# Patient Record
Sex: MALE | Race: WHITE | HISPANIC OR LATINO | Employment: OTHER
[De-identification: names, ages, dates, MRNs, and addresses within clinical notes are randomized per-mention and may not be internally consistent; named-entity substitution may affect disease eponyms.]

---

## 2018-10-17 ENCOUNTER — RECORDS - HEALTHEAST (OUTPATIENT)
Dept: ADMINISTRATIVE | Facility: OTHER | Age: 48
End: 2018-10-17

## 2018-10-17 ENCOUNTER — RECORDS - HEALTHEAST (OUTPATIENT)
Dept: LAB | Facility: CLINIC | Age: 48
End: 2018-10-17

## 2018-10-19 LAB
GAMMA INTERFERON BACKGROUND BLD IA-ACNC: 0.4 IU/ML
M TB IFN-G BLD-IMP: NEGATIVE
MITOGEN IGNF BCKGRD COR BLD-ACNC: 0.08 IU/ML
MITOGEN IGNF BCKGRD COR BLD-ACNC: 0.09 IU/ML
QTF INTERPRETATION: NORMAL
QTF MITOGEN - NIL: 7.71 IU/ML

## 2020-02-24 ENCOUNTER — HEALTH MAINTENANCE LETTER (OUTPATIENT)
Age: 50
End: 2020-02-24

## 2020-12-13 ENCOUNTER — HEALTH MAINTENANCE LETTER (OUTPATIENT)
Age: 50
End: 2020-12-13

## 2021-04-17 ENCOUNTER — HEALTH MAINTENANCE LETTER (OUTPATIENT)
Age: 51
End: 2021-04-17

## 2021-05-21 ENCOUNTER — NURSE TRIAGE (OUTPATIENT)
Dept: NURSING | Facility: CLINIC | Age: 51
End: 2021-05-21

## 2021-06-04 ENCOUNTER — ALLIED HEALTH/NURSE VISIT (OUTPATIENT)
Dept: NURSING | Facility: CLINIC | Age: 51
End: 2021-06-04
Payer: COMMERCIAL

## 2021-06-04 DIAGNOSIS — Z11.1 SCREENING EXAMINATION FOR PULMONARY TUBERCULOSIS: Primary | ICD-10-CM

## 2021-06-04 PROCEDURE — 86580 TB INTRADERMAL TEST: CPT

## 2021-06-04 PROCEDURE — 99207 PR NO CHARGE NURSE ONLY: CPT

## 2021-06-04 NOTE — NURSING NOTE
The patient is asked the following questions today and these are his answers:    -Have you had a mantoux administered in the past 30 days?    No  -Have you had a previous positive Mantoux.  No  -Have you received BCG in the past.  No  -Have you had a live vaccine  (MMR, Varicella, OPV, Yellow Fever) in the last 6 weeks.  No  -Have you had and active  viral or bacterial infection in the past 6 weeks.  No  -Have you received corticosteroids or immunosuppressive agents in the past 6 weeks.  No  -Have you been diagnosed with HIV?  No  -Do you have a malignancy?  No    Mantoux Questionnaire: answers were all negative.          PLACED ON RIGHT FOREARM 9:05AM BY Regine Herrera CMA     LOT: [id number], EXP: 01/06/2023, NDC: 47115-202-49  Patient will have this read outside of this clinic.     Regine Herrera CMA

## 2021-06-25 DIAGNOSIS — Z31.41 ENCOUNTER FOR SPERM COUNT FOR FERTILITY TESTING: ICD-10-CM

## 2021-06-25 PROCEDURE — 89320 SEMEN ANAL VOL/COUNT/MOT: CPT

## 2021-06-28 LAB
ABSTINENCE DAYS: 5 DAYS (ref 2–7)
AGGLUTINATION: NO YES/NO
ANALYSIS TEMP - CENTIGRADE: 23 CENTIGRADE
CELL FRAGMENTS: ABNORMAL %
COLLECTION METHOD: ABNORMAL
COLLECTION SITE: ABNORMAL
CONSENT TO RELEASE TO PARTNER: YES
IMMATURE SPERM: ABNORMAL %
IMMOTILE: 100 %
LAB RECEIPT TIME: ABNORMAL
LIQUEFIED: YES YES/NO
NON-PROGRESSIVE MOTILITY: 0 %
PROGRESSIVE MOTILITY: 0 % (ref 32–?)
ROUND CELLS: <0.1 MILLION/ML (ref ?–2)
SPECIMEN CONCENTRATION: 8 MILLION/ML (ref 15–?)
SPECIMEN PH: 7.6 PH (ref 7.2–?)
SPECIMEN TYPE: ABNORMAL
SPECIMEN VOL UR: 4.8 ML (ref 1.5–?)
TIME OF ANALYSIS: ABNORMAL
TOTAL NUMBER: 38 MILLION (ref 39–?)
TOTAL PROGRESSIVE MOTILE: 0 MILLION (ref 15.6–?)
VISCOUS: NO YES/NO
VITALITY: 4 % (ref 58–?)
WBC SPECIMEN: ABNORMAL %

## 2021-07-02 DIAGNOSIS — Z31.41 ENCOUNTER FOR SPERM COUNT FOR FERTILITY TESTING: Primary | ICD-10-CM

## 2021-08-20 ENCOUNTER — LAB (OUTPATIENT)
Dept: LAB | Facility: CLINIC | Age: 51
End: 2021-08-20
Attending: UROLOGY
Payer: COMMERCIAL

## 2021-08-20 DIAGNOSIS — Z31.41 ENCOUNTER FOR SPERM COUNT FOR FERTILITY TESTING: ICD-10-CM

## 2021-08-20 PROCEDURE — 89320 SEMEN ANAL VOL/COUNT/MOT: CPT

## 2021-08-23 LAB
ABSTINENCE DAYS: 2 DAYS (ref 2–7)
AGGLUTINATION: NO
ANALYSIS TEMP - CENTIGRADE: 22 CENTIGRADE
COLLECTION METHOD: NORMAL
COLLECTION SITE: NORMAL
CONSENT TO RELEASE TO PARTNER: YES
DAL- RECEIVED TIME: NORMAL
IMMOTILE: 62 %
LIQUEFIED: YES
NON-PROGRESSIVE MOTILITY: 2 %
PROGRESSIVE MOTILITY: 36 %
ROUND CELLS: 0 MILLION/ML
SPECIMEN PH: 7.6 PH
SPECIMEN VOLUME: 5.3 ML
SPERM CONCENTRATION: 20 MILLION/ML
TIME OF ANALYSIS: NORMAL
TOTAL PROGRESSIVE MOTILE NUMBER: 38 MILLION
TOTAL SPERM NUMBER: 106 MILLION
VISCOUS: NO
VITALITY: NORMAL

## 2021-09-10 ENCOUNTER — OFFICE VISIT (OUTPATIENT)
Dept: URGENT CARE | Facility: URGENT CARE | Age: 51
End: 2021-09-10
Payer: COMMERCIAL

## 2021-09-10 VITALS
TEMPERATURE: 98 F | BODY MASS INDEX: 36.21 KG/M2 | RESPIRATION RATE: 16 BRPM | HEART RATE: 86 BPM | SYSTOLIC BLOOD PRESSURE: 137 MMHG | WEIGHT: 245.2 LBS | OXYGEN SATURATION: 99 % | DIASTOLIC BLOOD PRESSURE: 83 MMHG

## 2021-09-10 DIAGNOSIS — R21 RASH AND NONSPECIFIC SKIN ERUPTION: ICD-10-CM

## 2021-09-10 DIAGNOSIS — B37.2 CANDIDIASIS OF SKIN: Primary | ICD-10-CM

## 2021-09-10 LAB
ALBUMIN SERPL-MCNC: 4.2 G/DL (ref 3.4–5)
ALP SERPL-CCNC: 65 U/L (ref 40–150)
ALT SERPL W P-5'-P-CCNC: 51 U/L (ref 0–70)
ANION GAP SERPL CALCULATED.3IONS-SCNC: 4 MMOL/L (ref 3–14)
AST SERPL W P-5'-P-CCNC: 27 U/L (ref 0–45)
BASOPHILS # BLD AUTO: 0.1 10E3/UL (ref 0–0.2)
BASOPHILS NFR BLD AUTO: 1 %
BILIRUB SERPL-MCNC: 0.5 MG/DL (ref 0.2–1.3)
BUN SERPL-MCNC: 19 MG/DL (ref 7–30)
CALCIUM SERPL-MCNC: 9.4 MG/DL (ref 8.5–10.1)
CHLORIDE BLD-SCNC: 106 MMOL/L (ref 94–109)
CO2 SERPL-SCNC: 28 MMOL/L (ref 20–32)
CREAT SERPL-MCNC: 1.26 MG/DL (ref 0.66–1.25)
EOSINOPHIL # BLD AUTO: 0.2 10E3/UL (ref 0–0.7)
EOSINOPHIL NFR BLD AUTO: 4 %
ERYTHROCYTE [DISTWIDTH] IN BLOOD BY AUTOMATED COUNT: 12.7 % (ref 10–15)
GFR SERPL CREATININE-BSD FRML MDRD: 67 ML/MIN/1.73M2
GLUCOSE BLD-MCNC: 88 MG/DL (ref 70–99)
HCT VFR BLD AUTO: 42.2 % (ref 40–53)
HGB BLD-MCNC: 14.2 G/DL (ref 13.3–17.7)
IMM GRANULOCYTES # BLD: 0 10E3/UL
IMM GRANULOCYTES NFR BLD: 0 %
LYMPHOCYTES # BLD AUTO: 2.2 10E3/UL (ref 0.8–5.3)
LYMPHOCYTES NFR BLD AUTO: 33 %
MCH RBC QN AUTO: 30 PG (ref 26.5–33)
MCHC RBC AUTO-ENTMCNC: 33.6 G/DL (ref 31.5–36.5)
MCV RBC AUTO: 89 FL (ref 78–100)
MONOCYTES # BLD AUTO: 0.7 10E3/UL (ref 0–1.3)
MONOCYTES NFR BLD AUTO: 11 %
NEUTROPHILS # BLD AUTO: 3.3 10E3/UL (ref 1.6–8.3)
NEUTROPHILS NFR BLD AUTO: 51 %
PLATELET # BLD AUTO: 266 10E3/UL (ref 150–450)
POTASSIUM BLD-SCNC: 3.8 MMOL/L (ref 3.4–5.3)
PROT SERPL-MCNC: 7.3 G/DL (ref 6.8–8.8)
RBC # BLD AUTO: 4.73 10E6/UL (ref 4.4–5.9)
SODIUM SERPL-SCNC: 138 MMOL/L (ref 133–144)
WBC # BLD AUTO: 6.6 10E3/UL (ref 4–11)

## 2021-09-10 PROCEDURE — 85025 COMPLETE CBC W/AUTO DIFF WBC: CPT | Performed by: PHYSICIAN ASSISTANT

## 2021-09-10 PROCEDURE — 99203 OFFICE O/P NEW LOW 30 MIN: CPT | Performed by: PHYSICIAN ASSISTANT

## 2021-09-10 PROCEDURE — 80053 COMPREHEN METABOLIC PANEL: CPT | Performed by: PHYSICIAN ASSISTANT

## 2021-09-10 PROCEDURE — 36415 COLL VENOUS BLD VENIPUNCTURE: CPT | Performed by: PHYSICIAN ASSISTANT

## 2021-09-10 RX ORDER — NYSTATIN 100000 U/G
CREAM TOPICAL 2 TIMES DAILY
Qty: 30 G | Refills: 1 | Status: SHIPPED | OUTPATIENT
Start: 2021-09-10 | End: 2021-09-24

## 2021-09-10 RX ORDER — CELECOXIB 200 MG/1
CAPSULE ORAL
COMMUNITY
Start: 2021-07-07 | End: 2022-08-29

## 2021-09-10 NOTE — PROGRESS NOTES
Chief Complaint   Patient presents with     Rash         Medical Decision Making:    Differential Diagnosis:  Rash: Atopic dermatitis  Candidiasis  Cellulitis  Contact dermatitis  Tinea corporis  Zoster  Viral exanthem       ASSESSMENT:    ICD-10-CM    1. Candidiasis of skin  B37.2 nystatin (MYCOSTATIN) 001525 UNIT/GM external cream     Comprehensive metabolic panel (BMP + Alb, Alk Phos, ALT, AST, Total. Bili, TP)     CBC with platelets and differential     CBC with platelets and differential     Comprehensive metabolic panel (BMP + Alb, Alk Phos, ALT, AST, Total. Bili, TP)   2. Rash and nonspecific skin eruption  R21 nystatin (MYCOSTATIN) 261615 UNIT/GM external cream     Comprehensive metabolic panel (BMP + Alb, Alk Phos, ALT, AST, Total. Bili, TP)     CBC with platelets and differential     CBC with platelets and differential     Comprehensive metabolic panel (BMP + Alb, Alk Phos, ALT, AST, Total. Bili, TP)         PLAN: Rash likely candidiasis. Will get routine labs to rule out diabetes, kidney or liver issues. Nystatin twice daily over the next 2 weeks.  See today's orders.  Follow-up with primary clinic if not improving.  Advised about symptoms which might herald more serious problems.        Sheba Wu PA-C         SUBJECTIVE:  52 yo male is here with his wife for pruritic rash for about 10 days. Started underneath his arms and also notes it in the groin, lower abdomen and waistline. No prior rash like this. No fever. No cough or sore throat. Tried oatmeal scrub and oatmeal lotion without resolution. Did have recent reverse vasectomy and due to inflammation after he was placed on Celebrex for 1 month then steroids, 2 different courses and then most recently Celebrex again for 2 weeks. No new topicals or laundry detergents. They do have 2 dogs but they have not been scratching or itching more than usual. Wife does not have any similar rash.             Allergies   Allergen Reactions     Percocet  [Oxycodone-Acetaminophen] Itching       Past Medical History:   Diagnosis Date     Elevated cholesterol      Hypertriglyceridemia        NO ACTIVE MEDICATIONS,   order for DME, Equipment being ordered: trilok ankle brace    No current facility-administered medications on file prior to visit.      Social History     Tobacco Use     Smoking status: Former Smoker     Quit date: 1995     Years since quittin.2     Smokeless tobacco: Never Used   Substance Use Topics     Alcohol use: Yes     Drug use: No       ROS:  General: negative for fever  SKIN: + as above      Physcial Exam:  /83 (BP Location: Left arm, Patient Position: Sitting, Cuff Size: Adult Large)   Pulse 86   Temp 98  F (36.7  C) (Tympanic)   Resp 16   Wt 111.2 kg (245 lb 3.2 oz)   SpO2 99%   BMI 36.21 kg/m        GENERAL: alert, no acute distress  EYES: conjunctival clear  RESP: Regular breathing rate  NEURO: awake .  SKIN: Bilateral axillary areas, upper lateral rib cage areas, bilateral groin and waistline with red confluent macules of various sizes with satellite lesions.   RAFFAELE JacksonC

## 2021-09-15 DIAGNOSIS — Z31.84 ENCOUNTER FOR FERTILITY PRESERVATION PROCEDURE: ICD-10-CM

## 2021-09-15 DIAGNOSIS — Z31.62 ENCOUNTER FOR FERTILITY PRESERVATION COUNSELING: Primary | ICD-10-CM

## 2021-09-17 ENCOUNTER — LAB (OUTPATIENT)
Dept: LAB | Facility: CLINIC | Age: 51
End: 2021-09-17
Payer: COMMERCIAL

## 2021-09-17 ENCOUNTER — VIRTUAL VISIT (OUTPATIENT)
Dept: FAMILY MEDICINE | Facility: CLINIC | Age: 51
End: 2021-09-17
Payer: COMMERCIAL

## 2021-09-17 DIAGNOSIS — Z11.4 SCREENING FOR HUMAN IMMUNODEFICIENCY VIRUS: ICD-10-CM

## 2021-09-17 DIAGNOSIS — Z11.4 SCREENING FOR HUMAN IMMUNODEFICIENCY VIRUS: Primary | ICD-10-CM

## 2021-09-17 DIAGNOSIS — Z11.59 NEED FOR HEPATITIS C SCREENING TEST: ICD-10-CM

## 2021-09-17 DIAGNOSIS — Z11.59 NEED FOR HEPATITIS B SCREENING TEST: ICD-10-CM

## 2021-09-17 PROCEDURE — 36415 COLL VENOUS BLD VENIPUNCTURE: CPT

## 2021-09-17 PROCEDURE — 99213 OFFICE O/P EST LOW 20 MIN: CPT | Mod: 95 | Performed by: FAMILY MEDICINE

## 2021-09-17 PROCEDURE — 87389 HIV-1 AG W/HIV-1&-2 AB AG IA: CPT

## 2021-09-17 PROCEDURE — 86803 HEPATITIS C AB TEST: CPT

## 2021-09-17 PROCEDURE — 87340 HEPATITIS B SURFACE AG IA: CPT

## 2021-09-17 NOTE — PROGRESS NOTES
John is a 51 year old who is being evaluated via a billable video visit.      How would you like to obtain your AVS? MyChart  If the video visit is dropped, the invitation should be resent by: Text to cell phone: mobile  Will anyone else be joining your video visit? No      Video Start Time: 810AM      Subjective   John is a 51 year old who presents for the following health issues:    HPI     Patient requesting HIV, hep b and hep c screening tests for andrology clinic.    Review of Systems   Constitutional, HEENT, cardiovascular, pulmonary, GI, , musculoskeletal, neuro, skin, endocrine and psych systems are negative, except as otherwise noted.      Objective           Vitals:  No vitals were obtained today due to virtual visit.    Physical Exam   GENERAL: Healthy, alert and no distress  EYES: Eyes grossly normal to inspection.  No discharge or erythema, or obvious scleral/conjunctival abnormalities.  RESP: No audible wheeze, cough, or visible cyanosis.  No visible retractions or increased work of breathing.    SKIN: Visible skin clear. No significant rash, abnormal pigmentation or lesions.  NEURO: Cranial nerves grossly intact.  Mentation and speech appropriate for age.  PSYCH: Mentation appears normal, affect normal/bright, judgement and insight intact, normal speech and appearance well-groomed.    A/P:  (Z11.4) Screening for human immunodeficiency virus  (primary encounter diagnosis)  Comment:   Plan: HIV Antigen Antibody Combo            (Z11.59) Need for hepatitis B screening test  Comment:   Plan: Hepatitis B surface antigen            (Z11.59) Need for hepatitis C screening test  Comment:   Plan: Hepatitis C antibody            Natalio Durham MD          Video-Visit Details    Type of service:  Video Visit    Video End Time:815AM    Originating Location (pt. Location): Home    Distant Location (provider location):  [facility]     Platform used for Video Visit: [employer]

## 2021-09-20 DIAGNOSIS — Z31.84 ENCOUNTER FOR FERTILITY PRESERVATION PROCEDURE: Primary | ICD-10-CM

## 2021-09-20 DIAGNOSIS — Z31.62 ENCOUNTER FOR FERTILITY PRESERVATION COUNSELING: ICD-10-CM

## 2021-09-20 LAB
HCV AB SERPL QL IA: NONREACTIVE
HIV 1+2 AB+HIV1 P24 AG SERPL QL IA: NONREACTIVE

## 2021-09-21 LAB — HBV SURFACE AG SERPL QL IA: NONREACTIVE

## 2021-09-24 ENCOUNTER — APPOINTMENT (OUTPATIENT)
Dept: LAB | Facility: CLINIC | Age: 51
End: 2021-09-24
Attending: UROLOGY
Payer: COMMERCIAL

## 2021-09-24 PROCEDURE — 89259 CRYOPRESERVATION SPERM: CPT | Performed by: UROLOGY

## 2021-09-26 ENCOUNTER — HEALTH MAINTENANCE LETTER (OUTPATIENT)
Age: 51
End: 2021-09-26

## 2021-09-27 LAB
ABSTINENCE DAYS: 2 DAYS (ref 2–7)
AGGLUTINATION: NO
ANALYSIS TEMP - CENTIGRADE: 21 CENTIGRADE
COLLECTION METHOD: ABNORMAL
COLLECTION SITE: ABNORMAL
CONSENT TO RELEASE TO PARTNER: YES
DAL- RECEIVED TIME: ABNORMAL
IMMOTILE: 71 %
LIQUEFIED: YES
NON-PROGRESSIVE MOTILITY: 7 %
PROGRESSIVE MOTILITY: 22 %
ROUND CELLS: <0.1 MILLION/ML
SPECIMEN PH: 7.6 PH
SPECIMEN VOLUME: 4.1 ML
SPERM CONCENTRATION: 1 MILLION/ML
TIME OF ANALYSIS: ABNORMAL
TOTAL PROGRESSIVE MOTILE NUMBER: 0.9 MILLION
TOTAL SPERM NUMBER: 4 MILLION
VISCOUS: NO
VITALITY: 41 %

## 2021-11-05 ENCOUNTER — LAB (OUTPATIENT)
Dept: LAB | Facility: CLINIC | Age: 51
End: 2021-11-05
Attending: UROLOGY
Payer: COMMERCIAL

## 2021-11-05 DIAGNOSIS — Z31.41 ENCOUNTER FOR SPERM COUNT FOR FERTILITY TESTING: ICD-10-CM

## 2021-11-05 PROCEDURE — 89320 SEMEN ANAL VOL/COUNT/MOT: CPT

## 2021-11-08 LAB
ABSTINENCE DAYS: 2 DAYS (ref 2–7)
AGGLUTINATION: NO
ANALYSIS TEMP - CENTIGRADE: 21 CENTIGRADE
COLLECTION METHOD: ABNORMAL
COLLECTION SITE: ABNORMAL
CONSENT TO RELEASE TO PARTNER: YES
DAL- RECEIVED TIME: ABNORMAL
IMMOTILE: 57 %
LIQUEFIED: YES
NON-PROGRESSIVE MOTILITY: 3 %
PROGRESSIVE MOTILITY: 40 %
ROUND CELLS: <0.1 MILLION/ML
SPECIMEN PH: 7.6 PH
SPECIMEN VOLUME: 4.6 ML
SPERM CONCENTRATION: 9 MILLION/ML
TIME OF ANALYSIS: ABNORMAL
TOTAL PROGRESSIVE MOTILE NUMBER: 16 MILLION
TOTAL SPERM NUMBER: 41 MILLION
VISCOUS: NO
VITALITY: ABNORMAL

## 2021-11-12 DIAGNOSIS — Z31.41 ENCOUNTER FOR SPERM COUNT FOR FERTILITY TESTING: Primary | ICD-10-CM

## 2021-12-10 ENCOUNTER — LAB (OUTPATIENT)
Dept: LAB | Facility: CLINIC | Age: 51
End: 2021-12-10
Attending: UROLOGY
Payer: COMMERCIAL

## 2021-12-10 DIAGNOSIS — Z31.41 ENCOUNTER FOR SPERM COUNT FOR FERTILITY TESTING: ICD-10-CM

## 2021-12-10 PROCEDURE — 89320 SEMEN ANAL VOL/COUNT/MOT: CPT

## 2021-12-13 LAB
ABSTINENCE DAYS: 2 DAYS (ref 2–7)
AGGLUTINATION: NO
ANALYSIS TEMP - CENTIGRADE: 24 CENTIGRADE
COLLECTION METHOD: ABNORMAL
COLLECTION SITE: ABNORMAL
CONSENT TO RELEASE TO PARTNER: YES
DAL- RECEIVED TIME: ABNORMAL
IMMOTILE: 58 %
LIQUEFIED: YES
NON-PROGRESSIVE MOTILITY: 8 %
PROGRESSIVE MOTILITY: 34 %
ROUND CELLS: <0.1 MILLION/ML
SPECIMEN PH: 7.6 PH
SPECIMEN VOLUME: 4.2 ML
SPERM CONCENTRATION: 2 MILLION/ML
TIME OF ANALYSIS: ABNORMAL
TOTAL PROGRESSIVE MOTILE NUMBER: 3 MILLION
TOTAL SPERM NUMBER: 8 MILLION
VISCOUS: NO
VITALITY: ABNORMAL

## 2022-01-07 ENCOUNTER — APPOINTMENT (OUTPATIENT)
Dept: LAB | Facility: CLINIC | Age: 52
End: 2022-01-07
Attending: UROLOGY
Payer: COMMERCIAL

## 2022-01-07 PROCEDURE — 89320 SEMEN ANAL VOL/COUNT/MOT: CPT | Performed by: UROLOGY

## 2022-01-15 ENCOUNTER — LAB (OUTPATIENT)
Dept: LAB | Facility: CLINIC | Age: 52
End: 2022-01-15
Payer: COMMERCIAL

## 2022-01-15 DIAGNOSIS — E29.1 MALE HYPOGONADISM: ICD-10-CM

## 2022-01-15 DIAGNOSIS — E29.1 HYPOGONADISM MALE: ICD-10-CM

## 2022-01-15 LAB
ESTRADIOL SERPL-MCNC: 87 PG/ML
FSH SERPL-ACNC: 11.6 IU/L (ref 0.7–10.8)
LH SERPL-ACNC: 3.2 IU/L (ref 1.5–9.3)

## 2022-01-15 PROCEDURE — 84403 ASSAY OF TOTAL TESTOSTERONE: CPT

## 2022-01-15 PROCEDURE — 83002 ASSAY OF GONADOTROPIN (LH): CPT

## 2022-01-15 PROCEDURE — 82670 ASSAY OF TOTAL ESTRADIOL: CPT

## 2022-01-15 PROCEDURE — 83001 ASSAY OF GONADOTROPIN (FSH): CPT

## 2022-01-15 PROCEDURE — 36415 COLL VENOUS BLD VENIPUNCTURE: CPT

## 2022-01-18 LAB — TESTOST SERPL-MCNC: 249 NG/DL (ref 240–950)

## 2022-02-11 ENCOUNTER — LAB (OUTPATIENT)
Dept: LAB | Facility: CLINIC | Age: 52
End: 2022-02-11
Attending: UROLOGY
Payer: COMMERCIAL

## 2022-02-11 DIAGNOSIS — Z31.41 ENCOUNTER FOR SPERM COUNT FOR FERTILITY TESTING: ICD-10-CM

## 2022-02-11 LAB
ABSTINENCE DAYS: 3 DAYS (ref 2–7)
AGGLUTINATION: NO
ANALYSIS TEMP - CENTIGRADE: 22 CENTIGRADE
COLLECTION METHOD: ABNORMAL
COLLECTION SITE: ABNORMAL
CONSENT TO RELEASE TO PARTNER: YES
DAL- RECEIVED TIME: ABNORMAL
IMMOTILE: 82 %
LIQUEFIED: YES
NON-PROGRESSIVE MOTILITY: 3 %
PROGRESSIVE MOTILITY: 15 %
ROUND CELLS: 0.2 MILLION/ML
SPECIMEN PH: 7.2 PH
SPECIMEN VOLUME: 3.9 ML
SPERM CONCENTRATION: 0.8 MILLION/ML
TIME OF ANALYSIS: ABNORMAL
TOTAL PROGRESSIVE MOTILE NUMBER: 0.45 MILLION
TOTAL SPERM NUMBER: 3 MILLION
VISCOUS: NO
VITALITY: ABNORMAL

## 2022-02-11 PROCEDURE — 89320 SEMEN ANAL VOL/COUNT/MOT: CPT

## 2022-03-11 ENCOUNTER — LAB (OUTPATIENT)
Dept: LAB | Facility: CLINIC | Age: 52
End: 2022-03-11
Attending: UROLOGY
Payer: COMMERCIAL

## 2022-03-11 DIAGNOSIS — Z31.41 ENCOUNTER FOR SPERM COUNT FOR FERTILITY TESTING: ICD-10-CM

## 2022-03-11 LAB
ABSTINENCE DAYS: 2 DAYS (ref 2–7)
AGGLUTINATION: NO
ANALYSIS TEMP - CENTIGRADE: 23 CENTIGRADE
COLLECTION METHOD: ABNORMAL
COLLECTION SITE: ABNORMAL
CONSENT TO RELEASE TO PARTNER: YES
DAL- RECEIVED TIME: ABNORMAL
IMMOTILE: 86 %
LIQUEFIED: YES
NON-PROGRESSIVE MOTILITY: 5 %
PROGRESSIVE MOTILITY: 9 %
ROUND CELLS: <0.1 MILLION/ML
SPECIMEN PH: 7.2 PH
SPECIMEN VOLUME: 4 ML
SPERM CONCENTRATION: 0.3 MILLION/ML
TIME OF ANALYSIS: ABNORMAL
TOTAL PROGRESSIVE MOTILE NUMBER: 0.09 MILLION
TOTAL SPERM NUMBER: 1 MILLION
VISCOUS: NO
VITALITY: ABNORMAL

## 2022-03-11 PROCEDURE — 89320 SEMEN ANAL VOL/COUNT/MOT: CPT

## 2022-04-08 ENCOUNTER — LAB (OUTPATIENT)
Dept: LAB | Facility: CLINIC | Age: 52
End: 2022-04-08
Attending: UROLOGY
Payer: COMMERCIAL

## 2022-04-08 DIAGNOSIS — Z31.41 ENCOUNTER FOR SPERM COUNT FOR FERTILITY TESTING: ICD-10-CM

## 2022-04-08 PROCEDURE — 89320 SEMEN ANAL VOL/COUNT/MOT: CPT

## 2022-04-11 LAB
ABSTINENCE DAYS: 3 DAYS (ref 2–7)
AGGLUTINATION: NO
ANALYSIS TEMP - CENTIGRADE: 23 CENTIGRADE
COLLECTION METHOD: ABNORMAL
COLLECTION SITE: ABNORMAL
CONSENT TO RELEASE TO PARTNER: YES
DAL- RECEIVED TIME: ABNORMAL
IMMOTILE: 100 %
LIQUEFIED: YES
NON-PROGRESSIVE MOTILITY: 0 %
PROGRESSIVE MOTILITY: 0 %
ROUND CELLS: <0.1 MILLION/ML
SPECIMEN PH: 7.2 PH
SPECIMEN VOLUME: 5.3 ML
SPERM CONCENTRATION: 0.1 MILLION/ML
TIME OF ANALYSIS: ABNORMAL
TOTAL PROGRESSIVE MOTILE NUMBER: 0 MILLION
TOTAL SPERM NUMBER: 0.5 MILLION
VISCOUS: NO
VITALITY: ABNORMAL

## 2022-04-15 DIAGNOSIS — E29.1 HYPOGONADISM MALE: Primary | ICD-10-CM

## 2022-04-16 ENCOUNTER — LAB (OUTPATIENT)
Dept: LAB | Facility: CLINIC | Age: 52
End: 2022-04-16
Payer: COMMERCIAL

## 2022-04-16 DIAGNOSIS — E29.1 HYPOGONADISM MALE: ICD-10-CM

## 2022-04-16 LAB
ESTRADIOL SERPL-MCNC: 70 PG/ML
FSH SERPL-ACNC: 24.2 IU/L (ref 0.7–10.8)
LH SERPL-ACNC: 7.4 IU/L (ref 1.5–9.3)

## 2022-04-16 PROCEDURE — 83002 ASSAY OF GONADOTROPIN (LH): CPT

## 2022-04-16 PROCEDURE — 83001 ASSAY OF GONADOTROPIN (FSH): CPT

## 2022-04-16 PROCEDURE — 82670 ASSAY OF TOTAL ESTRADIOL: CPT

## 2022-04-16 PROCEDURE — 84403 ASSAY OF TOTAL TESTOSTERONE: CPT

## 2022-04-16 PROCEDURE — 36415 COLL VENOUS BLD VENIPUNCTURE: CPT

## 2022-04-19 LAB — TESTOST SERPL-MCNC: 435 NG/DL (ref 240–950)

## 2022-05-08 ENCOUNTER — HEALTH MAINTENANCE LETTER (OUTPATIENT)
Age: 52
End: 2022-05-08

## 2022-08-29 ENCOUNTER — OFFICE VISIT (OUTPATIENT)
Dept: FAMILY MEDICINE | Facility: CLINIC | Age: 52
End: 2022-08-29
Payer: COMMERCIAL

## 2022-08-29 VITALS
RESPIRATION RATE: 9 BRPM | SYSTOLIC BLOOD PRESSURE: 117 MMHG | OXYGEN SATURATION: 97 % | HEIGHT: 69 IN | BODY MASS INDEX: 35.31 KG/M2 | TEMPERATURE: 97.9 F | HEART RATE: 88 BPM | WEIGHT: 238.4 LBS | DIASTOLIC BLOOD PRESSURE: 81 MMHG

## 2022-08-29 DIAGNOSIS — Z22.7 LATENT TUBERCULOSIS: ICD-10-CM

## 2022-08-29 DIAGNOSIS — Z12.11 SCREEN FOR COLON CANCER: ICD-10-CM

## 2022-08-29 DIAGNOSIS — Z11.1 SCREENING EXAMINATION FOR PULMONARY TUBERCULOSIS: Primary | ICD-10-CM

## 2022-08-29 LAB
ALBUMIN SERPL-MCNC: 4 G/DL (ref 3.4–5)
ALP SERPL-CCNC: 52 U/L (ref 40–150)
ALT SERPL W P-5'-P-CCNC: 40 U/L (ref 0–70)
AST SERPL W P-5'-P-CCNC: 30 U/L (ref 0–45)
BILIRUB DIRECT SERPL-MCNC: <0.1 MG/DL (ref 0–0.2)
BILIRUB SERPL-MCNC: 0.5 MG/DL (ref 0.2–1.3)
PROT SERPL-MCNC: 7.1 G/DL (ref 6.8–8.8)

## 2022-08-29 PROCEDURE — 99213 OFFICE O/P EST LOW 20 MIN: CPT | Performed by: PHYSICIAN ASSISTANT

## 2022-08-29 PROCEDURE — 36415 COLL VENOUS BLD VENIPUNCTURE: CPT | Performed by: PHYSICIAN ASSISTANT

## 2022-08-29 PROCEDURE — 80076 HEPATIC FUNCTION PANEL: CPT | Performed by: PHYSICIAN ASSISTANT

## 2022-08-29 PROCEDURE — 86481 TB AG RESPONSE T-CELL SUSP: CPT | Performed by: PHYSICIAN ASSISTANT

## 2022-08-29 NOTE — PROGRESS NOTES
"  Assessment & Plan   Problem List Items Addressed This Visit    None     Visit Diagnoses     Screening examination for pulmonary tuberculosis    -  Primary    Relevant Orders    Quantiferon TB Gold Plus    Hepatic panel (Albumin, ALT, AST, Bili, Alk Phos, TP)    Screen for colon cancer        Relevant Orders    COLOGUARD(EXACT SCIENCES) (Completed)         Discussed latent TB treatment  will confirm latent Tb with Tb gold test before initiating the treatment   Discussed Rifampin and liver enzymes screen today. Patient had a negative chest xray on 4/1/22  Letter was provided     12 minutes spent on the date of the encounter doing chart review, history and exam, documentation and further activities per the note       BMI:   Estimated body mass index is 34.79 kg/m  as calculated from the following:    Height as of this encounter: 1.763 m (5' 9.41\").    Weight as of this encounter: 108.1 kg (238 lb 6.4 oz).           Return in about 1 week (around 9/5/2022), or if symptoms worsen or fail to improve.    Ange Dubose PA-C  [facility] EMA Lopez is a 52 year old presenting for the following health issues:  Results (Review TB results) and Letter for School/Work    History of Present Illness       Reason for visit:  Review tb results and write a letter for employer    He eats 0-1 servings of fruits and vegetables daily.He consumes 0 sweetened beverage(s) daily.He exercises with enough effort to increase his heart rate 9 or less minutes per day.  He exercises with enough effort to increase his heart rate 3 or less days per week.   He is taking medications regularly.     Patient had positive T-spot on 3/25/22 with a follow up chest xray that was negative for active TB  Patient denies any chest pain, cough, hemoptysis, fevers/night sweats or weight loss.   Patient reports that in 2007 he might have had a possible exposure at work and was given a shot to prevent TB, presumably BCG " "vaccine     Review of Systems   Constitutional, HEENT, cardiovascular, pulmonary, gi and gu systems are negative, except as otherwise noted.      Objective    /81 (BP Location: Left arm, Patient Position: Sitting, Cuff Size: Adult Large)   Pulse 88   Temp 97.9  F (36.6  C) (Oral)   Resp 9   Ht 1.763 m (5' 9.41\")   Wt 108.1 kg (238 lb 6.4 oz)   SpO2 97%   BMI 34.79 kg/m    Body mass index is 34.79 kg/m .  Physical Exam     GENERAL: healthy, alert and no distress  EYES: Eyes grossly normal to inspection,  conjunctivae and sclerae normal  HENT: normal cephalic/atraumatic, face is symmetrical,   RESP: no difficulty breathing, no use of accessory muscles observed.   CV: no peripheral edema and color normal, no parasternal heaves visualized.   MS: no gross musculoskeletal defects noted, no edema  SKIN: no suspicious lesions or rashes  NEURO: Normal strength and tone, mentation intact and speech normal  PSYCH: mentation appears normal, affect normal/bright      Labs: T-spot and chest xray were reviewed and scanned into the chart               .  ..  "

## 2022-08-29 NOTE — PATIENT INSTRUCTIONS
At [facility], we strive to deliver an exceptional experience to you, every time we see you. If you receive a survey, please complete it as we do value your feedback.  If you have MyChart, you can expect to receive results automatically within 24 hours of their completion.  Your provider will send a note interpreting your results as well.   If you do not have MyChart, you should receive your results in about a week by mail.    Your care team:                            Family Medicine Internal Medicine   MD Rafa Mosquera MD Shantel Branch-Fleming, MD Srinivasa Vaka, MD Katya Belousova, LISA Lindsay CNP, MD ([facility]   Jagjit Hernández, MD Steph Humphreys MD Amelia Massimini APRN CNP Kim Thein, APRN CNP Bethany Templen, MD             Clinic hours: Monday - Thursday 7 am-6 pm; Fridays 7 am-5 pm.   Urgent care: Monday - Friday 10 am- 8 pm; Saturday and Sunday 9 am-5 pm.    Clinic: [phone]       [facility]: Monday - Thursday 8 am - 7 pm; Friday 8 am - 6 pm  [facility]: [phone]

## 2022-08-29 NOTE — LETTER
[facility]  [address] [address]  Phone: [phone]    August 29, 2022        John Joshi  [address] [facility] [address]          To whom it may concern:    RE: John Joshi    Patient was seen and treated today at our clinic. Patient has had a positive TB t-spot on March 25 2022 with a follow up chest xray that showed no active TB on 4/1/22. Patient is considered not contageous and is safe to return to work    Please contact me for questions or concerns.      Sincerely,        Ange Dubose PA-C

## 2022-08-31 ENCOUNTER — MYC MEDICAL ADVICE (OUTPATIENT)
Dept: FAMILY MEDICINE | Facility: CLINIC | Age: 52
End: 2022-08-31

## 2022-08-31 LAB
GAMMA INTERFERON BACKGROUND BLD IA-ACNC: 0.11 IU/ML
M TB IFN-G BLD-IMP: POSITIVE
M TB IFN-G CD4+ BCKGRND COR BLD-ACNC: 9.89 IU/ML
MITOGEN IGNF BCKGRD COR BLD-ACNC: 0.89 IU/ML
MITOGEN IGNF BCKGRD COR BLD-ACNC: 1.02 IU/ML
QUANTIFERON MITOGEN: 10 IU/ML
QUANTIFERON NIL TUBE: 0.11 IU/ML
QUANTIFERON TB1 TUBE: 1.13 IU/ML
QUANTIFERON TB2 TUBE: 1

## 2022-09-01 RX ORDER — RIFAMPIN 300 MG/1
600 CAPSULE ORAL DAILY
Qty: 240 CAPSULE | Refills: 0 | Status: SHIPPED | OUTPATIENT
Start: 2022-09-01 | End: 2022-12-30

## 2022-09-01 NOTE — TELEPHONE ENCOUNTER
Pt is responding to TB results from 829.      Routing to provider.    Estella Garner RN  [facility]

## 2022-09-27 ENCOUNTER — MYC MEDICAL ADVICE (OUTPATIENT)
Dept: FAMILY MEDICINE | Facility: CLINIC | Age: 52
End: 2022-09-27

## 2022-09-30 LAB — NONINV COLON CA DNA+OCC BLD SCRN STL QL: NEGATIVE

## 2022-10-31 ENCOUNTER — MYC MEDICAL ADVICE (OUTPATIENT)
Dept: FAMILY MEDICINE | Facility: CLINIC | Age: 52
End: 2022-10-31

## 2022-10-31 DIAGNOSIS — Z22.7 LATENT TUBERCULOSIS: Primary | ICD-10-CM

## 2022-11-16 ENCOUNTER — LAB (OUTPATIENT)
Dept: LAB | Facility: CLINIC | Age: 52
End: 2022-11-16
Payer: COMMERCIAL

## 2022-11-16 DIAGNOSIS — Z22.7 LATENT TUBERCULOSIS: ICD-10-CM

## 2022-11-16 DIAGNOSIS — Z13.220 SCREENING FOR HYPERLIPIDEMIA: Primary | ICD-10-CM

## 2022-11-16 LAB
ALBUMIN SERPL-MCNC: 4.2 G/DL (ref 3.4–5)
ALP SERPL-CCNC: 63 U/L (ref 40–150)
ALT SERPL W P-5'-P-CCNC: 26 U/L (ref 0–70)
AST SERPL W P-5'-P-CCNC: 15 U/L (ref 0–45)
BILIRUB DIRECT SERPL-MCNC: <0.1 MG/DL (ref 0–0.2)
BILIRUB SERPL-MCNC: 0.2 MG/DL (ref 0.2–1.3)
CHOLEST SERPL-MCNC: 190 MG/DL
FASTING STATUS PATIENT QL REPORTED: NO
HDLC SERPL-MCNC: 49 MG/DL
LDLC SERPL CALC-MCNC: 89 MG/DL
NONHDLC SERPL-MCNC: 141 MG/DL
PROT SERPL-MCNC: 7 G/DL (ref 6.8–8.8)
TRIGL SERPL-MCNC: 261 MG/DL

## 2022-11-16 PROCEDURE — 36415 COLL VENOUS BLD VENIPUNCTURE: CPT

## 2022-11-16 PROCEDURE — 80076 HEPATIC FUNCTION PANEL: CPT

## 2022-11-16 PROCEDURE — 80061 LIPID PANEL: CPT

## 2022-12-27 DIAGNOSIS — Z22.7 LATENT TUBERCULOSIS: ICD-10-CM

## 2022-12-28 RX ORDER — RIFAMPIN 300 MG/1
600 CAPSULE ORAL DAILY
Qty: 60 CAPSULE | Refills: 3 | OUTPATIENT
Start: 2022-12-28 | End: 2023-04-27

## 2023-01-02 ENCOUNTER — MYC MEDICAL ADVICE (OUTPATIENT)
Dept: FAMILY MEDICINE | Facility: CLINIC | Age: 53
End: 2023-01-02

## 2023-04-23 ENCOUNTER — HEALTH MAINTENANCE LETTER (OUTPATIENT)
Age: 53
End: 2023-04-23

## 2023-06-02 ENCOUNTER — HEALTH MAINTENANCE LETTER (OUTPATIENT)
Age: 53
End: 2023-06-02

## 2023-07-31 ENCOUNTER — OFFICE VISIT (OUTPATIENT)
Dept: FAMILY MEDICINE | Facility: CLINIC | Age: 53
End: 2023-07-31
Payer: COMMERCIAL

## 2023-07-31 VITALS
OXYGEN SATURATION: 98 % | HEART RATE: 88 BPM | SYSTOLIC BLOOD PRESSURE: 130 MMHG | DIASTOLIC BLOOD PRESSURE: 85 MMHG | RESPIRATION RATE: 20 BRPM | BODY MASS INDEX: 37.8 KG/M2 | WEIGHT: 255.2 LBS | TEMPERATURE: 97.4 F | HEIGHT: 69 IN

## 2023-07-31 DIAGNOSIS — M79.631 PAIN OF RIGHT FOREARM: ICD-10-CM

## 2023-07-31 DIAGNOSIS — R53.83 OTHER FATIGUE: ICD-10-CM

## 2023-07-31 DIAGNOSIS — E29.1 HYPOGONADISM MALE: Primary | ICD-10-CM

## 2023-07-31 DIAGNOSIS — E66.812 CLASS 2 OBESITY DUE TO EXCESS CALORIES WITHOUT SERIOUS COMORBIDITY WITH BODY MASS INDEX (BMI) OF 37.0 TO 37.9 IN ADULT: ICD-10-CM

## 2023-07-31 DIAGNOSIS — E66.09 CLASS 2 OBESITY DUE TO EXCESS CALORIES WITHOUT SERIOUS COMORBIDITY WITH BODY MASS INDEX (BMI) OF 37.0 TO 37.9 IN ADULT: ICD-10-CM

## 2023-07-31 LAB
ALBUMIN SERPL BCG-MCNC: 4.9 G/DL (ref 3.5–5.2)
ALP SERPL-CCNC: 66 U/L (ref 40–129)
ALT SERPL W P-5'-P-CCNC: 37 U/L (ref 0–70)
ANION GAP SERPL CALCULATED.3IONS-SCNC: 11 MMOL/L (ref 7–15)
AST SERPL W P-5'-P-CCNC: 46 U/L (ref 0–45)
BILIRUB SERPL-MCNC: 0.5 MG/DL
BUN SERPL-MCNC: 17 MG/DL (ref 6–20)
CALCIUM SERPL-MCNC: 9.5 MG/DL (ref 8.6–10)
CHLORIDE SERPL-SCNC: 105 MMOL/L (ref 98–107)
CHOLEST SERPL-MCNC: 184 MG/DL
CREAT SERPL-MCNC: 1.38 MG/DL (ref 0.67–1.17)
DEPRECATED CALCIDIOL+CALCIFEROL SERPL-MC: 31 UG/L (ref 20–75)
DEPRECATED HCO3 PLAS-SCNC: 24 MMOL/L (ref 22–29)
ERYTHROCYTE [DISTWIDTH] IN BLOOD BY AUTOMATED COUNT: 13.4 % (ref 10–15)
GFR SERPL CREATININE-BSD FRML MDRD: 62 ML/MIN/1.73M2
GLUCOSE SERPL-MCNC: 96 MG/DL (ref 70–99)
HBA1C MFR BLD: 6 % (ref 0–5.6)
HCT VFR BLD AUTO: 43.4 % (ref 40–53)
HDLC SERPL-MCNC: 33 MG/DL
HGB BLD-MCNC: 14.7 G/DL (ref 13.3–17.7)
LDLC SERPL CALC-MCNC: 100 MG/DL
MCH RBC QN AUTO: 29.6 PG (ref 26.5–33)
MCHC RBC AUTO-ENTMCNC: 33.9 G/DL (ref 31.5–36.5)
MCV RBC AUTO: 87 FL (ref 78–100)
NONHDLC SERPL-MCNC: 151 MG/DL
PLATELET # BLD AUTO: 272 10E3/UL (ref 150–450)
POTASSIUM SERPL-SCNC: 4.3 MMOL/L (ref 3.4–5.3)
PROT SERPL-MCNC: 7.3 G/DL (ref 6.4–8.3)
RBC # BLD AUTO: 4.97 10E6/UL (ref 4.4–5.9)
SODIUM SERPL-SCNC: 140 MMOL/L (ref 136–145)
TRIGL SERPL-MCNC: 257 MG/DL
TSH SERPL DL<=0.005 MIU/L-ACNC: 1.25 UIU/ML (ref 0.3–4.2)
WBC # BLD AUTO: 6.1 10E3/UL (ref 4–11)

## 2023-07-31 PROCEDURE — 80053 COMPREHEN METABOLIC PANEL: CPT | Performed by: PHYSICIAN ASSISTANT

## 2023-07-31 PROCEDURE — 99214 OFFICE O/P EST MOD 30 MIN: CPT | Performed by: PHYSICIAN ASSISTANT

## 2023-07-31 PROCEDURE — 83036 HEMOGLOBIN GLYCOSYLATED A1C: CPT | Performed by: PHYSICIAN ASSISTANT

## 2023-07-31 PROCEDURE — 85027 COMPLETE CBC AUTOMATED: CPT | Performed by: PHYSICIAN ASSISTANT

## 2023-07-31 PROCEDURE — 82306 VITAMIN D 25 HYDROXY: CPT | Performed by: PHYSICIAN ASSISTANT

## 2023-07-31 PROCEDURE — 84403 ASSAY OF TOTAL TESTOSTERONE: CPT | Performed by: PHYSICIAN ASSISTANT

## 2023-07-31 PROCEDURE — 84443 ASSAY THYROID STIM HORMONE: CPT | Performed by: PHYSICIAN ASSISTANT

## 2023-07-31 PROCEDURE — 36415 COLL VENOUS BLD VENIPUNCTURE: CPT | Performed by: PHYSICIAN ASSISTANT

## 2023-07-31 PROCEDURE — 80061 LIPID PANEL: CPT | Performed by: PHYSICIAN ASSISTANT

## 2023-07-31 ASSESSMENT — ENCOUNTER SYMPTOMS: FATIGUE: 1

## 2023-07-31 NOTE — PROGRESS NOTES
"  Assessment & Plan   Problem List Items Addressed This Visit    None  Visit Diagnoses       Hypogonadism male    -  Primary    Relevant Orders    Adult Endocrinology  Referral    Class 2 obesity due to excess calories without serious comorbidity with body mass index (BMI) of 37.0 to 37.9 in adult        Relevant Orders    Adult Comprehensive Weight Management  Referral    Lipid Profile (Chol, Trig, HDL, LDL calc)    Hemoglobin A1c    Other fatigue        Relevant Orders    TSH with free T4 reflex    Testosterone, total    CBC with platelets    Vitamin D Deficiency    Comprehensive metabolic panel (BMP + Alb, Alk Phos, ALT, AST, Total. Bili, TP)    Pain of right forearm        Relevant Orders    MR Forearm Right w/o Contrast           Establish with endocrinology for hypogonadism treatment     Schedule MRI to rule out bicep tendon rupture    25 minutes spent by me on the date of the encounter doing chart review, history and exam, documentation and further activities per the note       BMI:   Estimated body mass index is 37.24 kg/m  as calculated from the following:    Height as of this encounter: 1.763 m (5' 9.41\").    Weight as of this encounter: 115.8 kg (255 lb 3.2 oz).   Weight management plan: Patient referred to endocrine and/or weight management specialty        CYNTHIA [facility] DENNIS Lopez is a 53 year old, presenting for the following health issues:  Orders (Wants orders for hormonal check) and Fatigue        7/31/2023     7:20 AM   Additional Questions   Roomed by Jalen         7/31/2023     7:20 AM   Patient Reported Additional Medications   Patient reports taking the following new medications supplements       History of Present Illness       Reason for visit:  Always tired. Was on testosterone meds recently and need to be re- instated  Symptoms include:  Tired  Symptom intensity:  Moderate  Symptom progression:  Staying the " "same  Had these symptoms before:  Yes  Has tried/received treatment for these symptoms:  Yes  Previous treatment was successful:  Yes  Prior treatment description:  Testosterone meds  What makes it worse:  Not on meds  What makes it better:  Treatment    He eats 2-3 servings of fruits and vegetables daily.He consumes 0 sweetened beverage(s) daily.He exercises with enough effort to increase his heart rate 30 to 60 minutes per day.  He exercises with enough effort to increase his heart rate 4 days per week.   He is taking medications regularly.     hypogonadism    Duration: chronic-  Description (location/character/radiation): patient used to be treated by urology/endocrinology with Clomid and Anastrozole  Intensity:  moderate  Accompanying signs and symptoms: fatigue  History (similar episodes/previous evaluation):   Precipitating or alleviating factors:   Therapies tried and outcome: Clomid and Anastrozole, has been off medications for 1 year         Concern - right forearm injury  Onset: 8 years ago  Description: patient was holding a large piece granit and felt a pop and immediate pain in the right inner forearm in the antecubital region. Could not use arm for 1-2 months, then restarted, but feels weakness and pain with weight lifting since  Intensity: moderate  Progression of Symptoms:  same  Accompanying Signs & Symptoms: none  Previous history of similar problem: no  Precipitating factors:        Worsened by: lift weighting  Alleviating factors:        Improved by: rest  Therapies tried and outcome: tennis elbow strap        Review of Systems   Constitutional:  Positive for fatigue.      Constitutional, HEENT, cardiovascular, pulmonary, gi and gu systems are negative, except as otherwise noted.      Objective    /85 (BP Location: Left arm, Patient Position: Sitting, Cuff Size: Adult Large)   Pulse 88   Temp 97.4  F (36.3  C) (Oral)   Resp 20   Ht 1.763 m (5' 9.41\")   Wt 115.8 kg (255 lb 3.2 oz)   " SpO2 98%   BMI 37.24 kg/m    Body mass index is 37.24 kg/m .  Physical Exam   GENERAL: healthy, alert and no distress  NECK: no adenopathy, no asymmetry, masses, or scars and thyroid normal to palpation  RESP: lungs clear to auscultation - no rales, rhonchi or wheezes  CV: regular rate and rhythm, normal S1 S2, no S3 or S4, no murmur, click or rub, no peripheral edema and peripheral pulses strong  ABDOMEN: soft, nontender, no hepatosplenomegaly, no masses and bowel sounds normal  MS: no gross musculoskeletal defects noted, no edema  Right forearm- right  bicep is positioned lower zahra left and tenderness over the distal bicep tendon    Lab on 11/16/2022   Component Date Value Ref Range Status    Bilirubin Total 11/16/2022 0.2  0.2 - 1.3 mg/dL Final    Bilirubin Direct 11/16/2022 <0.1  0.0 - 0.2 mg/dL Final    Protein Total 11/16/2022 7.0  6.8 - 8.8 g/dL Final    Albumin 11/16/2022 4.2  3.4 - 5.0 g/dL Final    Alkaline Phosphatase 11/16/2022 63  40 - 150 U/L Final    AST 11/16/2022 15  0 - 45 U/L Final    ALT 11/16/2022 26  0 - 70 U/L Final    Cholesterol 11/16/2022 190  <200 mg/dL Final    Triglycerides 11/16/2022 261 (H)  <150 mg/dL Final    Direct Measure HDL 11/16/2022 49  >=40 mg/dL Final    LDL Cholesterol Calculated 11/16/2022 89  <=100 mg/dL Final    Non HDL Cholesterol 11/16/2022 141 (H)  <130 mg/dL Final    Patient Fasting > 8hrs? 11/16/2022 No   Final

## 2023-08-01 LAB — TESTOST SERPL-MCNC: 251 NG/DL (ref 240–950)

## 2023-08-22 ENCOUNTER — ANCILLARY PROCEDURE (OUTPATIENT)
Dept: MRI IMAGING | Facility: CLINIC | Age: 53
End: 2023-08-22
Attending: PHYSICIAN ASSISTANT
Payer: COMMERCIAL

## 2023-08-22 DIAGNOSIS — M79.631 PAIN OF RIGHT FOREARM: ICD-10-CM

## 2023-08-22 PROCEDURE — 73218 MRI UPPER EXTREMITY W/O DYE: CPT | Mod: RT | Performed by: RADIOLOGY

## 2023-08-23 NOTE — TELEPHONE ENCOUNTER
DIAGNOSIS: (R) elbow pain    APPOINTMENT DATE: 09/01/2023   NOTES STATUS DETAILS   OFFICE NOTE from referring provider Internal 07/31/2023 Dr Dubose [facility]    OFFICE NOTE from other specialist N/A    DISCHARGE SUMMARY from hospital N/A    DISCHARGE REPORT from the ER N/A    OPERATIVE REPORT N/A    EMG report N/A    MEDICATION LIST N/A    MRI Internal 08/22/2023 RT forearm [facility]    DEXA (osteoporosis/bone health) N/A    CT SCAN N/A    XRAYS (IMAGES & REPORTS) N/A

## 2023-08-31 NOTE — PROGRESS NOTES
John Joshi  :  1970  DOS: 2023  MRN: [id number]  PCP: Ange Dubose    Sports Medicine Clinic Visit      HPI  John Joshi is a 53 year old male who is seen in consultation at the request of  Ange Dubose PA-C presenting with right elbow pain    - Mechanism of Injury:  8 years ago: was holding a large piece of granite and felt a pop in his inner antecubital fossa. Was unable to use his arm for 1-2 months.  Weakness and pain with weight lifting since that time.  - Prior evaluation:    - 2023 with Ange Dubose PA-C.  Concern for distal biceps tendon rupture.  -MR R forearm 2023 shows distal biceps tendinosis with a very small focal low-grade intrasubstance tear of the biceps tendon and a low-grade intrasubstance tear of the common extensor tendon at the proximal attachment.    - Pain Character:  Pain has been present for  8+ years following above injury   More consistent over the past several months.  Pain is in the anterior elbow, distal biceps, with radiation into the volar proximal forearm .    - Endorses:  weakness and pain with lifting, elbow flexion movements, pulling.  - Denies:  swelling, clicking, popping, grinding, mechanical locking symptoms, instability, numbness, tingling.   - Alleviating factors:  rest, activity modifications  - Aggravating factors:  lifting heavy objects, pulling luggage, elbow flexion  - Treatments tried:  rest, activity modifications, ice, ibuprofen, bracing (tennis elbow strap, compression sleeve)    - Patient Goals:  get a formal diagnosis, understand the cause of the symptoms, discuss treatment options  - Social History: currently employed as coding/computer analaysis      Review of Systems  Musculoskeletal: as above  Remainder of review of systems is negative including constitutional, CV, pulmonary, GI, Skin and Neurologic except as noted in HPI or medical history.    Past Medical History:   Diagnosis Date    Elevated cholesterol      Hypertriglyceridemia      Past Surgical History:   Procedure Laterality Date    ENT SURGERY      sinus surgery-chronic sinusitis, polypectomy 2001    GENITOURINARY SURGERY      Vasectomy4/1995    VASECTOMY REVERSAL  04/01/2021    wisdom teeth[  2003, 8/2013     Family History   Problem Relation Age of Onset    Endocrine Disease Mother     Cardiovascular Mother         CHF    Genitourinary Problems Mother         ESRD    Heart Disease Mother     Hypertension Mother     Heart Disease Father     C.A.D. Father 60        MI    Genitourinary Problems Father         kidney stone    Cancer Sister 11        leukemia    Psychotic Disorder No family hx of     Thyroid Disease No family hx of     Depression No family hx of          Objective  There were no vitals taken for this visit.    General: healthy, alert and in no acute distress.    HEENT: no scleral icterus or conjunctival erythema.   Skin: no suspicious lesions or rash. No jaundice.   CV: regular rhythm by palpation, 2+ distal pulses.  Resp: normal respiratory effort without conversational dyspnea.   Psych: normal mood and affect.    Gait: nonantalgic, appropriate coordination and balance.     Neuro:        - Sensation to light touch:    - Intact throughout the BUE including all peripheral nerve distributions.        - MSR:       HAMIDA RO  - Biceps  2+ 2+  - Brachioradialis 2+ 2+  - Triceps  2+ 2+       - Special tests:   - Spurling's:  Neg   - Tinel's at the wrist:  Neg    - Tinel's at the elbow:  Neg    MSK - Elbow:       - Inspection:    - No significant swelling, erythema, warmth, ecchymosis, lesion, or atrophy noted.        - ROM:    - Full AROM/PROM with mild pain during elbow flexion, supination, wrist flexion.        - Palpation:    - TTP at the distal biceps tendon insertion with deep palpation, medial epicondyle/common flexor tendon.  - NTTP elsewhere.        - Strength:    HAMIDA RO  - Shoulder Abduction   5 5   - Shoulder Flexion   5 5   - Shoulder Internal  Rotation  5 5   - Shoulder External Rotation  5 5  - Elbow Flexion   5 5  - Elbow Extension   5 5  - Forearm Pronation   5 5  - Forearm Supination   5 5  - Wrist Extension   5 5  - Wrist Flexion    5 5  - FDI     5 5  - ADM     5 5  - FPL     5 5  - APB     5 5  - EIP     5 5  - EDC     5 5  - APL/EPB    5 5           - Special tests:  - Reverse Cozen's:  Pos  - Cozen's:  Neg  - VEO:  Neg  - Valgus stress:  Neg    - Varus stress:  Neg       Radiology  I independently reviewed the available relevant imaging, with the following interpretation:   -MR R forearm with interpretation as above in HPI    Exam: MR FOREARM RIGHT W/O CONTRAST     History: Pain of right forearm. Rule out distal biceps rupture. Trauma  8 years ago, pain since then.     Techniques: Multiplanar multisequence imaging through the right  forearm was obtained without administration of intra-articular or  intravenous gadolinium contrast  using routine protocol.     Comparison: None available.     Findings:     Medial external marker placed approximately 5.2 cm proximal to the  elbow joint line.     Bones:     No fracture, bone contusion or marrow infiltrative change.     Muscles:     No muscle atrophy. No muscle strain.     Internal derangement of joints are not well assessed owing to chosen  field of view.     Distal biceps tendinosis with likely tiny focal very low-grade  intrasubstance tear of the biceps. A physiologic amount of joint fluid  is present in the left elbow. Low-grade intrasubstance tear of the  common extensor at the proximal attachment.                                                                      IMPRESSION:   1. Distal biceps tendinosis with likely very low-grade partial  intrasubstance tearing. No high-grade tear.  2. Low-grade intrasubstance tear of the common extensor at the  proximal attachment.     MARIBEL CHELSI       Assessment  No diagnosis found.    Plan  John Joshi is a 53 year old male that presents with  anterior right elbow/proximal forearm pain.  Pain is primarily in the anterior elbow at the distal insertion of the biceps tendon where he had a significant lifting injury about 8 years ago and has had residual pain since that time.  Also has occasional medial elbow pain at the common flexor tendon origin with wrist flexion activities.  Recent MRI shows a low-grade intrasubstance tear of the distal biceps tendon, as well as a mild low-grade intrasubstance tear of the common extensor tendon at its origin.  He has no significant lateral elbow pain, but does feel medial elbow pain occasionally.    Functionally, he is still doing well, gets sore with elbow flexion, supination, and wrist flexion activities consistent with the partial tear in the distal biceps tendon as well as medial epicondylosis/golfer's elbow.  Discussed that his functional strength is good and he would likely not need surgery for the small partial tear in the distal biceps tendon.  Recommended physical therapy and referral was placed.  Also discussed that he can use the golfers elbow strap and Kinesiotape to help both the golfers elbow and distal biceps tendon and performing exacerbating activities.      Discussed the nature of the condition and treatment options and mutually agreed upon the following plan:    - Imaging:          - Reviewed relevant imaging in the chart.  - Reviewed results and images with patient.   - Medications:          - Discussed pharmacologic options for pain relief.   - May use NSAIDs (Ibuprofen, Naproxen) or Acetaminophen (Tylenol) as needed for pain control.   - May also use topical medications such as lidocaine, IcyHot, BioFreeze, or Voltaren gel as needed for pain control.   - Injections:          - Discussed possible injection options and alternatives.    - Options include corticosteroid injection medically open flexor tendon sheath or distal biceps tendon sheath.  Discussed the risk/benefits of such injections  especially in a situation where he is functionally doing well.    - Deferred injections today and will consider them in the future as needed.   - Therapy:          - Discussed the benefits of physical therapy vs home exercise program for optimization of range of motion, flexibility, strength, stability and function.   - Preference is for physical therapy.   - Physical Therapy referral placed today and instructed to call [phone] to schedule appointments.   - Modalities:          - May use ice, heat, massage or other modalities as needed.   - Bracing:          - Discussed bracing options and recommend using a [employer] elbow counterforce strap during activities that bring on medial elbow pain.    - Surgery:          - Discussed non-operative and operative treatment options for the patient's condition.   - Due to the low-grade nature of the distal biceps tendon insertion tear and is overall good functional status with ability to perform any high-level functions with only moderate soreness afterward, he would benefit most from nonoperative management.  We could consider surgical intervention if symptoms persist chronically.  - Activity:          - Encouraged to remain active and participate in regular activities as symptoms allow.  Instructed on activities that may induce pain such as elbow flexion, supination, pronation, wrist flexion and instructed to modify these activities as needed.  - Follow up:          - In 6 to 8 weeks if needed for re-evaluation and update to treatment plan, or sooner for new/worsening symptoms.  - Patient has clinic contact information for questions or concerns.       Robe Ontiveros DO, RUSSEL  [facility] - Sports Medicine  [facility] - Department of Orthopedic Surgery       Disclaimer:  This note was prepared and written using Dragon Medical dictation software. As a result, there may be errors in the script that have gone undetected. Please consider this when  interpreting the information in this note.

## 2023-09-01 ENCOUNTER — PRE VISIT (OUTPATIENT)
Dept: ORTHOPEDICS | Facility: CLINIC | Age: 53
End: 2023-09-01

## 2023-09-01 ENCOUNTER — OFFICE VISIT (OUTPATIENT)
Dept: ORTHOPEDICS | Facility: CLINIC | Age: 53
End: 2023-09-01
Attending: PHYSICIAN ASSISTANT
Payer: COMMERCIAL

## 2023-09-01 VITALS
BODY MASS INDEX: 36.58 KG/M2 | WEIGHT: 247 LBS | DIASTOLIC BLOOD PRESSURE: 84 MMHG | SYSTOLIC BLOOD PRESSURE: 123 MMHG | HEIGHT: 69 IN

## 2023-09-01 DIAGNOSIS — S46.211A RUPTURE OF RIGHT DISTAL BICEPS TENDON, INITIAL ENCOUNTER: Primary | ICD-10-CM

## 2023-09-01 DIAGNOSIS — M77.01 MEDIAL EPICONDYLITIS OF ELBOW, RIGHT: ICD-10-CM

## 2023-09-01 PROCEDURE — 99204 OFFICE O/P NEW MOD 45 MIN: CPT | Performed by: STUDENT IN AN ORGANIZED HEALTH CARE EDUCATION/TRAINING PROGRAM

## 2023-09-01 NOTE — LETTER
2023         RE: John Joshi  [address]  [place] [address]        Dear Colleague,    Thank you for referring your patient, John Joshi, to the [facility] [place]. Please see a copy of my visit note below.    John Joshi  :  1970  DOS: 2023  MRN: [id number]  PCP: Ange Dubose    Sports Medicine Clinic Visit      HPI  John Joshi is a 53 year old male who is seen in consultation at the request of  Ange Dubose PA-C presenting with right elbow pain    - Mechanism of Injury:  8 years ago: was holding a large piece of granite and felt a pop in his inner antecubital fossa. Was unable to use his arm for 1-2 months.  Weakness and pain with weight lifting since that time.  - Prior evaluation:    - 2023 with Ange Dubose PA-C.  Concern for distal biceps tendon rupture.  -MR R forearm 2023 shows distal biceps tendinosis with a very small focal low-grade intrasubstance tear of the biceps tendon and a low-grade intrasubstance tear of the common extensor tendon at the proximal attachment.    - Pain Character:  Pain has been present for  8+ years following above injury   More consistent over the past several months.  Pain is in the anterior elbow, distal biceps, with radiation into the volar proximal forearm .    - Endorses:  weakness and pain with lifting, elbow flexion movements, pulling.  - Denies:  swelling, clicking, popping, grinding, mechanical locking symptoms, instability, numbness, tingling.   - Alleviating factors:  rest, activity modifications  - Aggravating factors:  lifting heavy objects, pulling luggage, elbow flexion  - Treatments tried:  rest, activity modifications, ice, ibuprofen, bracing (tennis elbow strap, compression sleeve)    - Patient Goals:  get a formal diagnosis, understand the cause of the symptoms, discuss treatment options  - Social History: currently employed as coding/computer  analaysis      Review of Systems  Musculoskeletal: as above  Remainder of review of systems is negative including constitutional, CV, pulmonary, GI, Skin and Neurologic except as noted in HPI or medical history.    Past Medical History:   Diagnosis Date     Elevated cholesterol      Hypertriglyceridemia      Past Surgical History:   Procedure Laterality Date     ENT SURGERY      sinus surgery-chronic sinusitis, polypectomy 2001     GENITOURINARY SURGERY      Vasectomy4/1995     VASECTOMY REVERSAL  04/01/2021     wisdom teeth[  2003, 8/2013     Family History   Problem Relation Age of Onset     Endocrine Disease Mother      Cardiovascular Mother         CHF     Genitourinary Problems Mother         ESRD     Heart Disease Mother      Hypertension Mother      Heart Disease Father      C.A.D. Father 60        MI     Genitourinary Problems Father         kidney stone     Cancer Sister 11        leukemia     Psychotic Disorder No family hx of      Thyroid Disease No family hx of      Depression No family hx of          Objective  There were no vitals taken for this visit.    General: healthy, alert and in no acute distress.    HEENT: no scleral icterus or conjunctival erythema.   Skin: no suspicious lesions or rash. No jaundice.   CV: regular rhythm by palpation, 2+ distal pulses.  Resp: normal respiratory effort without conversational dyspnea.   Psych: normal mood and affect.    Gait: nonantalgic, appropriate coordination and balance.     Neuro:        - Sensation to light touch:    - Intact throughout the BUE including all peripheral nerve distributions.        - MSR:       RUE  LUE  - Biceps  2+ 2+  - Brachioradialis 2+ 2+  - Triceps  2+ 2+       - Special tests:   - Spurling's:  Neg   - Tinel's at the wrist:  Neg    - Tinel's at the elbow:  Neg    MSK - Elbow:       - Inspection:    - No significant swelling, erythema, warmth, ecchymosis, lesion, or atrophy noted.        - ROM:    - Full AROM/PROM with mild pain  during elbow flexion, supination, wrist flexion.        - Palpation:    - TTP at the distal biceps tendon insertion with deep palpation, medial epicondyle/common flexor tendon.  - NTTP elsewhere.        - Strength:    RUE LUE  - Shoulder Abduction   5 5   - Shoulder Flexion   5 5   - Shoulder Internal Rotation  5 5   - Shoulder External Rotation  5 5  - Elbow Flexion   5 5  - Elbow Extension   5 5  - Forearm Pronation   5 5  - Forearm Supination   5 5  - Wrist Extension   5 5  - Wrist Flexion    5 5  - FDI     5 5  - ADM     5 5  - FPL     5 5  - APB     5 5  - EIP     5 5  - EDC     5 5  - APL/EPB    5 5           - Special tests:  - Reverse Cozen's:  Pos  - Cozen's:  Neg  - VEO:  Neg  - Valgus stress:  Neg    - Varus stress:  Neg       Radiology  I independently reviewed the available relevant imaging, with the following interpretation:   -MR R forearm with interpretation as above in HPI    Exam: MR FOREARM RIGHT W/O CONTRAST     History: Pain of right forearm. Rule out distal biceps rupture. Trauma  8 years ago, pain since then.     Techniques: Multiplanar multisequence imaging through the right  forearm was obtained without administration of intra-articular or  intravenous gadolinium contrast  using routine protocol.     Comparison: None available.     Findings:     Medial external marker placed approximately 5.2 cm proximal to the  elbow joint line.     Bones:     No fracture, bone contusion or marrow infiltrative change.     Muscles:     No muscle atrophy. No muscle strain.     Internal derangement of joints are not well assessed owing to chosen  field of view.     Distal biceps tendinosis with likely tiny focal very low-grade  intrasubstance tear of the biceps. A physiologic amount of joint fluid  is present in the left elbow. Low-grade intrasubstance tear of the  common extensor at the proximal attachment.                                                                      IMPRESSION:   1. Distal biceps  tendinosis with likely very low-grade partial  intrasubstance tearing. No high-grade tear.  2. Low-grade intrasubstance tear of the common extensor at the  proximal attachment.     MARIBEL GAGNON       Assessment  No diagnosis found.    Plan  John Joshi is a 53 year old male that presents with anterior right elbow/proximal forearm pain.  Pain is primarily in the anterior elbow at the distal insertion of the biceps tendon where he had a significant lifting injury about 8 years ago and has had residual pain since that time.  Also has occasional medial elbow pain at the common flexor tendon origin with wrist flexion activities.  Recent MRI shows a low-grade intrasubstance tear of the distal biceps tendon, as well as a mild low-grade intrasubstance tear of the common extensor tendon at its origin.  He has no significant lateral elbow pain, but does feel medial elbow pain occasionally.    Functionally, he is still doing well, gets sore with elbow flexion, supination, and wrist flexion activities consistent with the partial tear in the distal biceps tendon as well as medial epicondylosis/golfer's elbow.  Discussed that his functional strength is good and he would likely not need surgery for the small partial tear in the distal biceps tendon.  Recommended physical therapy and referral was placed.  Also discussed that he can use the golfers elbow strap and Kinesiotape to help both the golfers elbow and distal biceps tendon and performing exacerbating activities.      Discussed the nature of the condition and treatment options and mutually agreed upon the following plan:    - Imaging:          - Reviewed relevant imaging in the chart.  - Reviewed results and images with patient.   - Medications:          - Discussed pharmacologic options for pain relief.   - May use NSAIDs (Ibuprofen, Naproxen) or Acetaminophen (Tylenol) as needed for pain control.   - May also use topical medications such as lidocaine, IcyHot,  BioFreeze, or Voltaren gel as needed for pain control.   - Injections:          - Discussed possible injection options and alternatives.    - Options include corticosteroid injection medically open flexor tendon sheath or distal biceps tendon sheath.  Discussed the risk/benefits of such injections especially in a situation where he is functionally doing well.    - Deferred injections today and will consider them in the future as needed.   - Therapy:          - Discussed the benefits of physical therapy vs home exercise program for optimization of range of motion, flexibility, strength, stability and function.   - Preference is for physical therapy.   - Physical Therapy referral placed today and instructed to call [phone] to schedule appointments.   - Modalities:          - May use ice, heat, massage or other modalities as needed.   - Bracing:          - Discussed bracing options and recommend using a [employer] elbow counterforce strap during activities that bring on medial elbow pain.    - Surgery:          - Discussed non-operative and operative treatment options for the patient's condition.   - Due to the low-grade nature of the distal biceps tendon insertion tear and is overall good functional status with ability to perform any high-level functions with only moderate soreness afterward, he would benefit most from nonoperative management.  We could consider surgical intervention if symptoms persist chronically.  - Activity:          - Encouraged to remain active and participate in regular activities as symptoms allow.  Instructed on activities that may induce pain such as elbow flexion, supination, pronation, wrist flexion and instructed to modify these activities as needed.  - Follow up:          - In 6 to 8 weeks if needed for re-evaluation and update to treatment plan, or sooner for new/worsening symptoms.  - Patient has clinic contact information for questions or concerns.       Robe Ontiveros DO, RUSSEL  [facility]  [facility] - Department of Orthopedic Surgery       Disclaimer:  This note was prepared and written using Dragon Medical dictation software. As a result, there may be errors in the script that have gone undetected. Please consider this when interpreting the information in this note.       Again, thank you for allowing me to participate in the care of your patient.        Sincerely,        Robe Ontiveros, DO

## 2023-09-11 ENCOUNTER — THERAPY VISIT (OUTPATIENT)
Dept: PHYSICAL THERAPY | Facility: CLINIC | Age: 53
End: 2023-09-11
Payer: COMMERCIAL

## 2023-09-11 DIAGNOSIS — S46.211S BICEPS MUSCLE TEAR, RIGHT, SEQUELA: Primary | ICD-10-CM

## 2023-09-11 PROCEDURE — 97110 THERAPEUTIC EXERCISES: CPT | Mod: GP | Performed by: PHYSICAL THERAPIST

## 2023-09-11 PROCEDURE — 97140 MANUAL THERAPY 1/> REGIONS: CPT | Mod: GP | Performed by: PHYSICAL THERAPIST

## 2023-09-11 PROCEDURE — 97161 PT EVAL LOW COMPLEX 20 MIN: CPT | Mod: GP | Performed by: PHYSICAL THERAPIST

## 2023-09-11 PROCEDURE — 97035 APP MDLTY 1+ULTRASOUND EA 15: CPT | Mod: GP | Performed by: PHYSICAL THERAPIST

## 2023-09-11 NOTE — PROGRESS NOTES
PHYSICAL THERAPY EVALUATION  Type of Visit: Evaluation    See electronic medical record for Abuse and Falls Screening details.    Subjective       Presenting condition or subjective complaint: Torn bicep  Date of onset:      Relevant medical history:     Dates & types of surgery:      Prior diagnostic imaging/testing results: MRI     Prior therapy history for the same diagnosis, illness or injury: No      Prior Level of Function  Transfers: Independent  Ambulation: Independent  ADL: Independent  IADL:     Living Environment  Social support: With a significant other or spouse   Type of home: House   Stairs to enter the home: Yes 13 Is there a railing: Yes   Ramp: No   Stairs inside the home: Yes 13 Is there a railing: Yes   Help at home: None  Equipment owned:       Employment: Yes   Hobbies/Interests: Water sports, fishing, motorcycle, wood working,firearms    Patient goals for therapy: Experience  no pain    Pain assessment: Pain present  Location: Right distal/right elbow/Ratin-10/[address]     Objective   PAIN: Pain Level at Rest: 0/10  Pain Level with Use: 10/10  Pain is Exacerbated By: pushing/pullling/lifting  Pain is Relieved By: none  INTEGUMENTARY (edema, incisions):   POSTURE:   ROM:   (Degrees) Left AROM Left PROM Right AROM Right PROM   Elbow Flexion   WNL    Elbow Extension   WNL    Elbow Hyperextension       Wrist Flexion   WNL    Wrist Extension   WNL    Supination   WNL    Pronation   WNL    Radial Deviation   WNL    Ulnar Deviation   WNL                                Pain:   End Feel:     STRENGTH:   Pain: - none + mild ++ moderate +++ severe  Strength Scale: 0-5/5 Left Right   Elbow Flexion  4-   Elbow Extension  5   Wrist Flexion  5   Wrist Extension  5   Supination  5   Pronation  5   Ulnar Deviation  5   Radial Deviation  5    Strength (lbs)     Lateral Pinch (lbs)     3 Point Pinch (lbs)          Hand Dominance:   FLEXIBILITY:   SPECIAL TESTS:   PALPATION:   + Tenderness At  Location Left Right   Lateral Epicondyle     Medial Epicondyle     Olecranon Bursa     Proximal Carpals     Distal Carpals     Pronator Teres     Wrist Flexors     Biceps  +   Triceps     Radial Head     Ulnar Collateral Ligament      TFCC     Intersection     ECRB Insertion     ECRL Insertion     1st Dorsal Compartment     ECU Insertion     FCU Insertion     Ulnar Styloid     FCR Insertion     PIN     SBRN       JOINT MOBILITY:   CERVICAL SCREEN:   THORACIC SCREEN:   SHOULDER SCREEN:     Assessment & Plan   CLINICAL IMPRESSIONS  Medical Diagnosis:      Treatment Diagnosis:     Impression/Assessment: Patient is a 53 year old male with Right bicep/elbow complaints.  The following significant findings have been identified: Pain, Decreased strength, Impaired muscle performance, and Decreased activity tolerance. These impairments interfere with their ability to perform self care tasks, recreational activities, and household chores as compared to previous level of function.     Clinical Decision Making (Complexity):  Clinical Presentation: Stable/Uncomplicated  Clinical Presentation Rationale: based on medical and personal factors listed in PT evaluation  Clinical Decision Making (Complexity): Low complexity    PLAN OF CARE  Treatment Interventions:  Modalities: Ultrasound  Interventions: Manual Therapy, Neuromuscular Re-education, Therapeutic Activity, Therapeutic Exercise    Long Term Goals            Frequency of Treatment:    Duration of Treatment:      Recommended Referrals to Other Professionals:   Education Assessment:        Risks and benefits of evaluation/treatment have been explained.   Patient/Family/caregiver agrees with Plan of Care.     Evaluation Time:             Signing Clinician: Kinsey Dunbar, PT

## 2023-09-20 ENCOUNTER — THERAPY VISIT (OUTPATIENT)
Dept: PHYSICAL THERAPY | Facility: CLINIC | Age: 53
End: 2023-09-20
Payer: COMMERCIAL

## 2023-09-20 DIAGNOSIS — S46.211S BICEPS MUSCLE TEAR, RIGHT, SEQUELA: Primary | ICD-10-CM

## 2023-09-20 PROCEDURE — 97140 MANUAL THERAPY 1/> REGIONS: CPT | Mod: GP | Performed by: PHYSICAL THERAPIST

## 2023-09-20 PROCEDURE — 97110 THERAPEUTIC EXERCISES: CPT | Mod: GP | Performed by: PHYSICAL THERAPIST

## 2023-09-20 PROCEDURE — 97035 APP MDLTY 1+ULTRASOUND EA 15: CPT | Mod: GP | Performed by: PHYSICAL THERAPIST

## 2023-09-27 ENCOUNTER — THERAPY VISIT (OUTPATIENT)
Dept: PHYSICAL THERAPY | Facility: CLINIC | Age: 53
End: 2023-09-27
Payer: COMMERCIAL

## 2023-09-27 DIAGNOSIS — S46.211S BICEPS MUSCLE TEAR, RIGHT, SEQUELA: Primary | ICD-10-CM

## 2023-09-27 PROCEDURE — 97035 APP MDLTY 1+ULTRASOUND EA 15: CPT | Mod: GP | Performed by: PHYSICAL THERAPIST

## 2023-09-27 PROCEDURE — 97140 MANUAL THERAPY 1/> REGIONS: CPT | Mod: GP | Performed by: PHYSICAL THERAPIST

## 2023-09-27 PROCEDURE — 97110 THERAPEUTIC EXERCISES: CPT | Mod: GP | Performed by: PHYSICAL THERAPIST

## 2023-09-29 ASSESSMENT — ENCOUNTER SYMPTOMS
WEIGHT LOSS: 0
MUSCLE CRAMPS: 0
DECREASED APPETITE: 0
BACK PAIN: 0
NECK PAIN: 0
WEIGHT GAIN: 1
CHILLS: 0
FATIGUE: 1
FEVER: 0
NIGHT SWEATS: 0
HALLUCINATIONS: 0
JOINT SWELLING: 0
ARTHRALGIAS: 0
MYALGIAS: 1
STIFFNESS: 0
POLYPHAGIA: 0
ALTERED TEMPERATURE REGULATION: 0
MUSCLE WEAKNESS: 0
POLYDIPSIA: 0
INCREASED ENERGY: 1

## 2023-10-05 ENCOUNTER — THERAPY VISIT (OUTPATIENT)
Dept: PHYSICAL THERAPY | Facility: CLINIC | Age: 53
End: 2023-10-05
Payer: COMMERCIAL

## 2023-10-05 DIAGNOSIS — S46.211S BICEPS MUSCLE TEAR, RIGHT, SEQUELA: Primary | ICD-10-CM

## 2023-10-05 PROCEDURE — 97035 APP MDLTY 1+ULTRASOUND EA 15: CPT | Mod: GP | Performed by: PHYSICAL THERAPIST

## 2023-10-05 PROCEDURE — 97140 MANUAL THERAPY 1/> REGIONS: CPT | Mod: GP | Performed by: PHYSICAL THERAPIST

## 2023-10-06 ENCOUNTER — VIRTUAL VISIT (OUTPATIENT)
Dept: ENDOCRINOLOGY | Facility: CLINIC | Age: 53
End: 2023-10-06
Payer: COMMERCIAL

## 2023-10-06 VITALS — WEIGHT: 249 LBS | BODY MASS INDEX: 36.35 KG/M2

## 2023-10-06 DIAGNOSIS — E29.1 HYPOGONADISM MALE: ICD-10-CM

## 2023-10-06 PROCEDURE — 99204 OFFICE O/P NEW MOD 45 MIN: CPT | Mod: VID | Performed by: INTERNAL MEDICINE

## 2023-10-06 RX ORDER — TESTOSTERONE CYPIONATE 200 MG/ML
100 INJECTION, SOLUTION INTRAMUSCULAR WEEKLY
Qty: 10 ML | Refills: 5 | Status: SHIPPED | OUTPATIENT
Start: 2023-10-06 | End: 2024-04-10

## 2023-10-06 NOTE — NURSING NOTE
Is the patient currently in the state of [place]? YES    Visit mode:VIDEO    If the visit is dropped, the patient can be reconnected by: VIDEO VISIT: Text to cell phone:   Telephone Information:   Mobile [phone]       Will anyone else be joining the visit? NO  (If patient encounters technical issues they should call [phone]    How would you like to obtain your AVS? MyChart    Are changes needed to the allergy or medication list? No    Reason for visit: RECHECK and Video Visit    Jacqueline RICE

## 2023-10-06 NOTE — LETTER
10/6/2023         RE: John Joshi  [address]  [place] [address]        Dear Colleague,    Thank you for referring your patient, John Joshi, to the [facility]. Please see a copy of my visit note below.    Virtual Visit Details    Type of service:  Video Visit   Start 12:40 pm  End 1:10 pm  [facility]                                                                               - Endocrinology Initial Consultation -    Reason for visit/consult:  Hypogonadism male    Primary care provider: Ange Dubose    HPI: A 54 yo male here for the evaluation for hypogonadism. He has had erectile dysfunction, low libido, low energy since 2018, when he underwent reversal of vasectomy around the same time. He and his partner underwent IVF process twice but unsuccessful.   He was prescribed clomid an anastrozole and his total testosterone level improved from 250 to 450s in 4/2022, however his medication ran out, and no medication for the past 1 year and his energy and libido got worse. He has not history of brain injury, no history of alcohol abuse, substance abuse, no history of use of testosterone product in the past.   No HA, no galactorrhea nor gynecomastia.   He fathered children with the previous partner. He does weight lifting 4 time a week in the gym.   Other medical history includes mild type 2 DM A1c around 8.       Past Medical/Surgical History:  Past Medical History:   Diagnosis Date     Elevated cholesterol      Hypertriglyceridemia      Past Surgical History:   Procedure Laterality Date     ENT SURGERY      sinus surgery-chronic sinusitis, polypectomy 2001     GENITOURINARY SURGERY      Vasectomy4/1995     VASECTOMY REVERSAL  04/01/2021     wisdom teeth[  2003, 8/2013       Allergies:  Allergies   Allergen Reactions     Percocet [Oxycodone-Acetaminophen] Itching       Current Medications   Current Outpatient Medications   Medication     Ferrous Sulfate (IRON PO)      No current facility-administered medications for this visit.       Family History:  Family History   Problem Relation Age of Onset     Endocrine Disease Mother      Cardiovascular Mother         CHF     Genitourinary Problems Mother         ESRD     Heart Disease Mother      Hypertension Mother      Heart Disease Father      C.A.D. Father 60        MI     Genitourinary Problems Father         kidney stone     Cancer Sister 11        leukemia     Psychotic Disorder No family hx of      Thyroid Disease No family hx of      Depression No family hx of        Social History:  Social History     Tobacco Use     Smoking status: Former     Types: Cigarettes     Quit date: 1995     Years since quittin.3     Smokeless tobacco: Never   Substance Use Topics     Alcohol use: Not Currently       ROS:  Full review of systems taken with the help of the intake sheet. Otherwise a complete 14 point review of systems was taken and is negative unless stated in the history above.        Physical Exam:   Vitals: Wt 112.9 kg (249 lb)   BMI 36.35 kg/m    BMI= Body mass index is 36.35 kg/m .   General: well appearing, no acute distress, pleasant and conversant,   Mental Status/neuro: alert and oriented  Face: symmetrical, normal facial color  Eyes: anicteric, no proptosis or lid lag  Resp: normally breathing      Labs : I reviewed data from epic and extract and summarize the pertinent data here.      Latest Reference Range & Units 01/15/22 11:53 22 10:33 23 08:27   Testosterone Total 240 - 950 ng/dL 249 435 251     Lab Results   Component Value Date     2023     03/15/2014      Lab Results   Component Value Date    POTASSIUM 4.3 2023    POTASSIUM 3.8 09/10/2021    POTASSIUM 4.6 03/15/2014     Lab Results   Component Value Date    CHLORIDE 105 2023    CHLORIDE 106 09/10/2021    CHLORIDE 101 03/15/2014     Lab Results   Component Value Date    TAI 9.5 2023    TAI 9.4 03/15/2014      Lab Results   Component Value Date    CO2 24 07/31/2023    CO2 28 09/10/2021    CO2 26 03/15/2014     Lab Results   Component Value Date    BUN 17.0 07/31/2023    BUN 19 09/10/2021    BUN 12 03/15/2014     Lab Results   Component Value Date    CR 1.38 07/31/2023    CR 1.32 03/15/2014     Lab Results   Component Value Date    GLC 96 07/31/2023    GLC 88 09/10/2021     03/15/2014     Lab Results   Component Value Date    TSH 1.25 07/31/2023    TSH 0.95 03/15/2014     No results found for: T4  Lab Results   Component Value Date    A1C 6.0 07/31/2023       No results found for: IGF1  Lab Results   Component Value Date    LH 7.4 04/16/2022     Lab Results   Component Value Date    FSH 24.2 04/16/2022     Lab Results   Component Value Date    ESTROGEN 70 04/16/2022     No results found for: PROLACTIN        MRI Brain: I personally reviewed the original images and agree with the below reports.   No results found for this or any previous visit.          Assessment and Plan  53 year old male with     Pituitary panel  - total testosterone, LH, FSH    - IGF1, PRL    - brain MRI    - start testosterone 100 mg injection every week    - recheck testosterone level in 3 and 6 months    RTC with me in 6 months      45 minutes spent on the date of the encounter doing chart review, history and exam, documentation and further activities as noted above.        Leonora Tobias MD  Staff Physician  Endocrinology and Metabolism  License: [id number]       Again, thank you for allowing me to participate in the care of your patient.        Sincerely,        Leonora Tobias MD

## 2023-10-06 NOTE — PROGRESS NOTES
Virtual Visit Details    Type of service:  Video Visit   Start 12:40 pm  End 1:10 pm  [facility]                                                                               - Endocrinology Initial Consultation -    Reason for visit/consult:  Hypogonadism male    Primary care provider: Ange Dubose    HPI: A 54 yo male here for the evaluation for hypogonadism. He has had erectile dysfunction, low libido, low energy since 2018, when he underwent reversal of vasectomy around the same time. He and his partner underwent IVF process twice but unsuccessful.   He was prescribed clomid an anastrozole and his total testosterone level improved from 250 to 450s in 4/2022, however his medication ran out, and no medication for the past 1 year and his energy and libido got worse. He has not history of brain injury, no history of alcohol abuse, substance abuse, no history of use of testosterone product in the past.   No HA, no galactorrhea nor gynecomastia.   He fathered children with the previous partner. He does weight lifting 4 time a week in the gym.   Other medical history includes mild type 2 DM A1c around 8.       Past Medical/Surgical History:  Past Medical History:   Diagnosis Date    Elevated cholesterol     Hypertriglyceridemia      Past Surgical History:   Procedure Laterality Date    ENT SURGERY      sinus surgery-chronic sinusitis, polypectomy 2001    GENITOURINARY SURGERY      Vasectomy4/1995    VASECTOMY REVERSAL  04/01/2021    wisdom teeth[  2003, 8/2013       Allergies:  Allergies   Allergen Reactions    Percocet [Oxycodone-Acetaminophen] Itching       Current Medications   Current Outpatient Medications   Medication    Ferrous Sulfate (IRON PO)     No current facility-administered medications for this visit.       Family History:  Family History   Problem Relation Age of Onset    Endocrine Disease Mother     Cardiovascular Mother         CHF    Genitourinary Problems Mother         ESRD    Heart Disease  Mother     Hypertension Mother     Heart Disease Father     C.A.D. Father 60        MI    Genitourinary Problems Father         kidney stone    Cancer Sister 11        leukemia    Psychotic Disorder No family hx of     Thyroid Disease No family hx of     Depression No family hx of        Social History:  Social History     Tobacco Use    Smoking status: Former     Types: Cigarettes     Quit date: 1995     Years since quittin.3    Smokeless tobacco: Never   Substance Use Topics    Alcohol use: Not Currently       ROS:  Full review of systems taken with the help of the intake sheet. Otherwise a complete 14 point review of systems was taken and is negative unless stated in the history above.        Physical Exam:   Vitals: Wt 112.9 kg (249 lb)   BMI 36.35 kg/m    BMI= Body mass index is 36.35 kg/m .   General: well appearing, no acute distress, pleasant and conversant,   Mental Status/neuro: alert and oriented  Face: symmetrical, normal facial color  Eyes: anicteric, no proptosis or lid lag  Resp: normally breathing      Labs : I reviewed data from epic and extract and summarize the pertinent data here.      Latest Reference Range & Units 01/15/22 11:53 22 10:33 23 08:27   Testosterone Total 240 - 950 ng/dL 249 435 251     Lab Results   Component Value Date     2023     03/15/2014      Lab Results   Component Value Date    POTASSIUM 4.3 2023    POTASSIUM 3.8 09/10/2021    POTASSIUM 4.6 03/15/2014     Lab Results   Component Value Date    CHLORIDE 105 2023    CHLORIDE 106 09/10/2021    CHLORIDE 101 03/15/2014     Lab Results   Component Value Date    TAI 9.5 2023    TAI 9.4 03/15/2014     Lab Results   Component Value Date    CO2 24 2023    CO2 28 09/10/2021    CO2 26 03/15/2014     Lab Results   Component Value Date    BUN 17.0 2023    BUN 19 09/10/2021    BUN 12 03/15/2014     Lab Results   Component Value Date    CR 1.38 2023    CR 1.32  03/15/2014     Lab Results   Component Value Date    GLC 96 07/31/2023    GLC 88 09/10/2021     03/15/2014     Lab Results   Component Value Date    TSH 1.25 07/31/2023    TSH 0.95 03/15/2014     No results found for: T4  Lab Results   Component Value Date    A1C 6.0 07/31/2023       No results found for: IGF1  Lab Results   Component Value Date    LH 7.4 04/16/2022     Lab Results   Component Value Date    FSH 24.2 04/16/2022     Lab Results   Component Value Date    ESTROGEN 70 04/16/2022     No results found for: PROLACTIN        MRI Brain: I personally reviewed the original images and agree with the below reports.   No results found for this or any previous visit.          Assessment and Plan  53 year old male with     Pituitary panel  - total testosterone, LH, FSH    - IGF1, PRL    - brain MRI    - start testosterone 100 mg injection every week    - recheck testosterone level in 3 and 6 months    RTC with me in 6 months      45 minutes spent on the date of the encounter doing chart review, history and exam, documentation and further activities as noted above.        Leonora Tobias MD  Staff Physician  Endocrinology and Metabolism  License: [id number]

## 2023-10-09 ENCOUNTER — LAB (OUTPATIENT)
Dept: LAB | Facility: CLINIC | Age: 53
End: 2023-10-09
Payer: COMMERCIAL

## 2023-10-09 ENCOUNTER — THERAPY VISIT (OUTPATIENT)
Dept: PHYSICAL THERAPY | Facility: CLINIC | Age: 53
End: 2023-10-09
Attending: STUDENT IN AN ORGANIZED HEALTH CARE EDUCATION/TRAINING PROGRAM
Payer: COMMERCIAL

## 2023-10-09 DIAGNOSIS — M77.01 MEDIAL EPICONDYLITIS OF ELBOW, RIGHT: ICD-10-CM

## 2023-10-09 DIAGNOSIS — S46.211A RUPTURE OF RIGHT DISTAL BICEPS TENDON, INITIAL ENCOUNTER: ICD-10-CM

## 2023-10-09 DIAGNOSIS — E29.1 HYPOGONADISM MALE: ICD-10-CM

## 2023-10-09 DIAGNOSIS — S46.211S BICEPS MUSCLE TEAR, RIGHT, SEQUELA: Primary | ICD-10-CM

## 2023-10-09 PROCEDURE — 84403 ASSAY OF TOTAL TESTOSTERONE: CPT

## 2023-10-09 PROCEDURE — 84305 ASSAY OF SOMATOMEDIN: CPT

## 2023-10-09 PROCEDURE — 97140 MANUAL THERAPY 1/> REGIONS: CPT | Mod: GP | Performed by: PHYSICAL THERAPIST

## 2023-10-09 PROCEDURE — 83002 ASSAY OF GONADOTROPIN (LH): CPT

## 2023-10-09 PROCEDURE — 83001 ASSAY OF GONADOTROPIN (FSH): CPT

## 2023-10-09 PROCEDURE — 84146 ASSAY OF PROLACTIN: CPT

## 2023-10-09 PROCEDURE — 97035 APP MDLTY 1+ULTRASOUND EA 15: CPT | Mod: GP | Performed by: PHYSICAL THERAPIST

## 2023-10-09 PROCEDURE — 36415 COLL VENOUS BLD VENIPUNCTURE: CPT

## 2023-10-10 LAB
FSH SERPL IRP2-ACNC: 5.3 MIU/ML (ref 1.5–12.4)
IGF-I BLD-MCNC: 125 NG/ML (ref 66–225)
LH SERPL-ACNC: 3.6 MIU/ML (ref 1.7–8.6)
PROLACTIN SERPL 3RD IS-MCNC: 8 NG/ML (ref 4–15)

## 2023-10-13 LAB — TESTOST SERPL-MCNC: 234 NG/DL (ref 240–950)

## 2023-10-16 ENCOUNTER — THERAPY VISIT (OUTPATIENT)
Dept: PHYSICAL THERAPY | Facility: CLINIC | Age: 53
End: 2023-10-16
Attending: STUDENT IN AN ORGANIZED HEALTH CARE EDUCATION/TRAINING PROGRAM
Payer: COMMERCIAL

## 2023-10-16 DIAGNOSIS — E29.1 HYPOGONADISM MALE: Primary | ICD-10-CM

## 2023-10-16 DIAGNOSIS — S46.211S BICEPS MUSCLE TEAR, RIGHT, SEQUELA: Primary | ICD-10-CM

## 2023-10-16 PROCEDURE — 97035 APP MDLTY 1+ULTRASOUND EA 15: CPT | Mod: GP | Performed by: PHYSICAL THERAPIST

## 2023-10-16 PROCEDURE — 97140 MANUAL THERAPY 1/> REGIONS: CPT | Mod: GP | Performed by: PHYSICAL THERAPIST

## 2023-10-16 NOTE — TELEPHONE ENCOUNTER
[facility] Call Center    Phone Message    May a detailed message be left on voicemail: yes     Reason for Call: Medication Refill Request    Has the patient contacted the pharmacy for the refill? Yes   Name of medication being requested: testosterone cypionate (DEPOTESTOSTERONE) 200 MG/ML injection Syringes   Provider who prescribed the medication: Dr Orellana  Pharmacy:   [id number] [place], [place] - [address]     Date medication is needed: asap      Action Taken: Other: Endo    Travel Screening: Not Applicable

## 2023-10-17 RX ORDER — SYRINGE WITH NEEDLE, 1 ML 25GX5/8"
1 SYRINGE, EMPTY DISPOSABLE MISCELLANEOUS WEEKLY
Qty: 12 EACH | Refills: 5 | Status: SHIPPED | OUTPATIENT
Start: 2023-10-17

## 2023-10-17 RX ORDER — NEEDLES, DISPOSABLE 25GX5/8"
1 NEEDLE, DISPOSABLE MISCELLANEOUS WEEKLY
Qty: 12 EACH | Refills: 5 | Status: SHIPPED | OUTPATIENT
Start: 2023-10-17

## 2023-10-17 NOTE — TELEPHONE ENCOUNTER
Syringes for Depotestosterone 200 MG/ML     Last Written Prescription Date:  ?  Last Fill Quantity: ?,   # refills: ?  Last Office Visit :  10/6/2023  Future Office visit:  4/19/2024    Routing refill request to provider for review/approval because:  Syringes for Depotestosterone 200 MG/ML not on updated med list.  Refer to Provider for review and new orders.     Lyudmila Stanley RN  Central Triage Red Flags/Med Refills

## 2023-10-17 NOTE — TELEPHONE ENCOUNTER
Leonora Tobias MD Teats, Lyudmila BOOTH RN  Caller: Unspecified (Yesterday,  1:15 PM)  Could you please fill the black part and route me back?    Thank you        Dr. Tobias,     Please review before you sign the orders for Pt care.     Please add 3 refills for each order before signing.     Thank you         Lyudmila Stanley RN  Central Triage Red Flags/Med Refills

## 2023-10-25 ENCOUNTER — THERAPY VISIT (OUTPATIENT)
Dept: PHYSICAL THERAPY | Facility: CLINIC | Age: 53
End: 2023-10-25
Payer: COMMERCIAL

## 2023-10-25 DIAGNOSIS — S46.211S BICEPS MUSCLE TEAR, RIGHT, SEQUELA: Primary | ICD-10-CM

## 2023-10-25 PROCEDURE — 97035 APP MDLTY 1+ULTRASOUND EA 15: CPT | Mod: GP | Performed by: PHYSICAL THERAPIST

## 2023-10-25 PROCEDURE — 97140 MANUAL THERAPY 1/> REGIONS: CPT | Mod: GP | Performed by: PHYSICAL THERAPIST

## 2023-10-30 ENCOUNTER — THERAPY VISIT (OUTPATIENT)
Dept: PHYSICAL THERAPY | Facility: CLINIC | Age: 53
End: 2023-10-30
Payer: COMMERCIAL

## 2023-10-30 DIAGNOSIS — S46.211S BICEPS MUSCLE TEAR, RIGHT, SEQUELA: Primary | ICD-10-CM

## 2023-10-30 PROCEDURE — 97140 MANUAL THERAPY 1/> REGIONS: CPT | Mod: GP | Performed by: PHYSICAL THERAPIST

## 2023-10-30 PROCEDURE — 97035 APP MDLTY 1+ULTRASOUND EA 15: CPT | Mod: GP | Performed by: PHYSICAL THERAPIST

## 2023-11-06 ENCOUNTER — ANCILLARY PROCEDURE (OUTPATIENT)
Dept: MRI IMAGING | Facility: CLINIC | Age: 53
End: 2023-11-06
Attending: INTERNAL MEDICINE
Payer: COMMERCIAL

## 2023-11-06 DIAGNOSIS — E29.1 HYPOGONADISM MALE: ICD-10-CM

## 2023-11-06 PROCEDURE — A9585 GADOBUTROL INJECTION: HCPCS | Mod: JZ | Performed by: RADIOLOGY

## 2023-11-06 PROCEDURE — 70553 MRI BRAIN STEM W/O & W/DYE: CPT | Mod: GC | Performed by: RADIOLOGY

## 2023-11-06 RX ORDER — GADOBUTROL 604.72 MG/ML
15 INJECTION INTRAVENOUS ONCE
Status: COMPLETED | OUTPATIENT
Start: 2023-11-06 | End: 2023-11-06

## 2023-11-06 RX ADMIN — GADOBUTROL 11.5 ML: 604.72 INJECTION INTRAVENOUS at 17:59

## 2023-11-08 ENCOUNTER — THERAPY VISIT (OUTPATIENT)
Dept: PHYSICAL THERAPY | Facility: CLINIC | Age: 53
End: 2023-11-08
Payer: COMMERCIAL

## 2023-11-08 DIAGNOSIS — S46.211S BICEPS MUSCLE TEAR, RIGHT, SEQUELA: Primary | ICD-10-CM

## 2023-11-08 PROCEDURE — 97140 MANUAL THERAPY 1/> REGIONS: CPT | Mod: GP | Performed by: PHYSICAL THERAPIST

## 2023-11-08 PROCEDURE — 97035 APP MDLTY 1+ULTRASOUND EA 15: CPT | Mod: GP | Performed by: PHYSICAL THERAPIST

## 2023-11-15 ENCOUNTER — THERAPY VISIT (OUTPATIENT)
Dept: PHYSICAL THERAPY | Facility: CLINIC | Age: 53
End: 2023-11-15
Payer: COMMERCIAL

## 2023-11-15 DIAGNOSIS — S46.211S BICEPS MUSCLE TEAR, RIGHT, SEQUELA: Primary | ICD-10-CM

## 2023-11-15 PROCEDURE — 97035 APP MDLTY 1+ULTRASOUND EA 15: CPT | Mod: GP | Performed by: PHYSICAL THERAPIST

## 2023-11-15 PROCEDURE — 97140 MANUAL THERAPY 1/> REGIONS: CPT | Mod: GP | Performed by: PHYSICAL THERAPIST

## 2023-11-15 NOTE — PROGRESS NOTES
11/15/23 0500   Appointment Info   Signing clinician's name / credentials Kinsey Dunbar DPT   Total/Authorized Visits 8   Visits Used 10   Medical Diagnosis R distal biceps   Progress Note/Certification   Therapy Frequency 1x/week   Predicted Duration 8 weeks   Progress Note Completed Date 09/11/23   PT Goal 1   Goal Identifier Lifting   Goal Description Be able to lift 20# pain free   Rationale to maximize safety and independence with performance of ADLs and functional tasks   Goal Progress Able to lift 15# pain free   Target Date 11/06/23   Subjective Report   Subjective Report Patient reports feeling less motivated to lift weights. He says that every time he tries to increase his weight he starts to feel pain in his bicep again.   Objective Measures   Objective Measures Objective Measure 1   Objective Measure 1   Objective Measure Palpation   Details Mild-moderate tone with tendernes most present in the central distal with a palpable knot present   Objective Measure 2   Objective Measure Elbow strength   Details Flexion 4+/5 painful, extension 5/5   Objective Measure 3   Objective Measure Elbow ROM   Details WNL   PT Modalities   PT Modalities Ultrasound   Ultrasound   Ultrasound Minutes (34165) 10   Ultrasound -Type (does not include 3-5 min prep/cleanup time) Continuous   Intensity 1.8   Duration (does not include the 3-5 min set up/clean up time) 8 min   Frequency 1 MHz   Location distal bicep medial>lateral   Positioning supine   Treatment Interventions (PT)   Interventions Therapeutic Procedure/Exercise;Manual Therapy   Therapeutic Procedure/Exercise   PTRx Ther Proc 1 Elbow Strengthening Flexion with Theraband   PTRx Ther Proc 1 - Details reviewed; 30x RTB palm p and thumb up   PTRx Ther Proc 2 Elbow Strengthening Extension with Theraband   PTRx Ther Proc 2 - Details reviewed-30x RTB   PTRx Ther Proc 3 Wrist Strengthening Isotonic Extension   PTRx Ther Proc 3 - Details Reviewed-30x RTB   PTRx Ther Proc  4 Wrist Strengthening Isotonic Flexion   PTRx Ther Proc 4 - Details reviewed-30x RTB   PTRx Ther Proc 5 Forearm Strengthening Supination with Theraband   PTRx Ther Proc 5 - Details reviewed-30x RTB   Manual Therapy   Manual Therapy: Mobilization, MFR, MLD, friction massage minutes (09349) 15   Manual Therapy 1 Myofacial release through medial/distal bicep wrapping into the medial arm   Patient Response/Progress decreased discomfort   Manual Therapy 1 - Details Moderate tone in distal biceps   Plan   Home program Continue to use RTB with exercises   Updates to plan of care Will be following up with MD visit.   Total Session Time   Timed Code Treatment Minutes 25   Total Treatment Time (sum of timed and untimed services) 25         PLAN  Follow up with MD for injection    Beginning/End Dates of Progress Note Reporting Period:  09/11/23 to 11/15/2023    Referring Provider:  Robe Ontiveros

## 2023-11-16 ENCOUNTER — OFFICE VISIT (OUTPATIENT)
Dept: ORTHOPEDICS | Facility: CLINIC | Age: 53
End: 2023-11-16
Payer: COMMERCIAL

## 2023-11-16 DIAGNOSIS — S46.211D RUPTURE OF RIGHT DISTAL BICEPS TENDON, SUBSEQUENT ENCOUNTER: Primary | ICD-10-CM

## 2023-11-16 PROCEDURE — 99213 OFFICE O/P EST LOW 20 MIN: CPT | Performed by: FAMILY MEDICINE

## 2023-11-16 NOTE — LETTER
"    11/16/2023         RE: John Joshi  [address]  [place] [address]        Dear Colleague,    Thank you for referring your patient, John Joshi, to the [facility] [place]. Please see a copy of my visit note below.    CHIEF COMPLAINT:  RECHECK (Biceps injury from [place]- therapy was some improvement but hit a plateau. Changed things with workout - stopped doing things that aggravated and that has helped. )       HISTORY OF PRESENT ILLNESS  Mr. Joshi is a pleasant 53 year old male who presents to clinic today with right elbow pain.  John has a history of distal biceps tendinitis, he has been seen and managed by Dr. Ontiveros for this.  He has been doing quite a bit better as of late.  He has reduced the amount of physical therapy that he is doing to about once or twice a week, he does feel that this has helped him the most.  It definitely helps him to continue the exercises, although the decreased frequency is decreasing his overall pain.        Additional history: as documented    MEDICAL HISTORY  Patient Active Problem List   Diagnosis     CARDIOVASCULAR SCREENING; LDL GOAL LESS THAN 130     Routine medical exam     Biceps muscle tear, right, sequela       Current Outpatient Medications   Medication Sig Dispense Refill     Ferrous Sulfate (IRON PO) Take 1 tablet by mouth daily       Needle, Disp, (BD DISP NEEDLES) 18G X 1-1/2\" MISC 1 each once a week (Pharmacy can substitute sizes or brand of needle for what  is in stock) 12 each 5     syringe/needle, disp, (B-D LUER-ZARA SYRINGE) 25G X 1\" 3 ML MISC Inject 1 each into the muscle once a week (Pharmacy can substitute sizes or brand of syringe with needle for what is in stock) 12 each 5     testosterone cypionate (DEPOTESTOSTERONE) 200 MG/ML injection Inject 0.5 mLs (100 mg) into the muscle once a week 10 mL 5       Allergies   Allergen Reactions     Percocet [Oxycodone-Acetaminophen] Itching       Family History "   Problem Relation Age of Onset     Endocrine Disease Mother      Cardiovascular Mother         CHF     Genitourinary Problems Mother         ESRD     Heart Disease Mother      Hypertension Mother      Heart Disease Father      C.A.D. Father 60        MI     Genitourinary Problems Father         kidney stone     Cancer Sister 11        leukemia     Psychotic Disorder No family hx of      Thyroid Disease No family hx of      Depression No family hx of        Additional medical/Social/Surgical histories reviewed in EPIC and updated as appropriate.        PHYSICAL EXAM  General  - normal appearance, in no obvious distress    Musculoskeletal - right elbow  - inspection: normal joint alignment, no obvious deformity, no swelling  - palpation: no bony or soft tissue tenderness  - ROM:  160 deg flexion   0 deg extension   90 deg pronation   90 deg supination  - strength: 5/5  strength, 5/5 flexion, extension, pronation, supination  Neuro  - no sensory or motor deficit, grossly normal coordination, normal muscle tone               ASSESSMENT & PLAN  Mr. Joshi is a 53 year old male who presents to clinic today with distal biceps tendinitis.    I ordered and independently reviewed an xray of overall Connie does seem to be improving, which is great news.  We did discuss that it may be reasonable to reassess with physical therapy, given his decreased pain with decreased motion, he may benefit from some new exercises or a slightly altered program.    We did discuss that PRP may be an option in the future if not improving, although I do think he will be able to avoid this.  If he does well we can follow-up as needed for this and other issues.    It was a pleasure seeing John today.    Dallas Alfred DO, [facility]  Primary Care Sports Medicine      This note was constructed using Dragon dictation software, please excuse any minor errors in spelling, grammar, or syntax.      Again, thank you for allowing me to participate in the care  of your patient.        Sincerely,        Dallas Alfred, DO

## 2023-11-16 NOTE — PROGRESS NOTES
"CHIEF COMPLAINT:  RECHECK (Biceps injury from [place]- therapy was some improvement but hit a plateau. Changed things with workout - stopped doing things that aggravated and that has helped. )       HISTORY OF PRESENT ILLNESS  Mr. Joshi is a pleasant 53 year old male who presents to clinic today with right elbow pain.  John has a history of distal biceps tendinitis, he has been seen and managed by Dr. Ontiveros for this.  He has been doing quite a bit better as of late.  He has reduced the amount of physical therapy that he is doing to about once or twice a week, he does feel that this has helped him the most.  It definitely helps him to continue the exercises, although the decreased frequency is decreasing his overall pain.        Additional history: as documented    MEDICAL HISTORY  Patient Active Problem List   Diagnosis     CARDIOVASCULAR SCREENING; LDL GOAL LESS THAN 130     Routine medical exam     Biceps muscle tear, right, sequela       Current Outpatient Medications   Medication Sig Dispense Refill     Ferrous Sulfate (IRON PO) Take 1 tablet by mouth daily       Needle, Disp, (BD DISP NEEDLES) 18G X 1-1/2\" MISC 1 each once a week (Pharmacy can substitute sizes or brand of needle for what  is in stock) 12 each 5     syringe/needle, disp, (B-D LUER-ZARA SYRINGE) 25G X 1\" 3 ML MISC Inject 1 each into the muscle once a week (Pharmacy can substitute sizes or brand of syringe with needle for what is in stock) 12 each 5     testosterone cypionate (DEPOTESTOSTERONE) 200 MG/ML injection Inject 0.5 mLs (100 mg) into the muscle once a week 10 mL 5       Allergies   Allergen Reactions     Percocet [Oxycodone-Acetaminophen] Itching       Family History   Problem Relation Age of Onset     Endocrine Disease Mother      Cardiovascular Mother         CHF     Genitourinary Problems Mother         ESRD     Heart Disease Mother      Hypertension Mother      Heart Disease Father      C.A.D. Father 60        MI     Genitourinary " Problems Father         kidney stone     Cancer Sister 11        leukemia     Psychotic Disorder No family hx of      Thyroid Disease No family hx of      Depression No family hx of        Additional medical/Social/Surgical histories reviewed in EPIC and updated as appropriate.        PHYSICAL EXAM  General  - normal appearance, in no obvious distress    Musculoskeletal - right elbow  - inspection: normal joint alignment, no obvious deformity, no swelling  - palpation: no bony or soft tissue tenderness  - ROM:  160 deg flexion   0 deg extension   90 deg pronation   90 deg supination  - strength: 5/5  strength, 5/5 flexion, extension, pronation, supination  Neuro  - no sensory or motor deficit, grossly normal coordination, normal muscle tone               ASSESSMENT & PLAN  Mr. Joshi is a 53 year old male who presents to clinic today with distal biceps tendinitis.    I ordered and independently reviewed an xray of overall Connie does seem to be improving, which is great news.  We did discuss that it may be reasonable to reassess with physical therapy, given his decreased pain with decreased motion, he may benefit from some new exercises or a slightly altered program.    We did discuss that PRP may be an option in the future if not improving, although I do think he will be able to avoid this.  If he does well we can follow-up as needed for this and other issues.    It was a pleasure seeing John today.    Dallas Alfred DO, [facility]  Primary Care Sports Medicine      This note was constructed using Dragon dictation software, please excuse any minor errors in spelling, grammar, or syntax.

## 2023-11-22 ENCOUNTER — THERAPY VISIT (OUTPATIENT)
Dept: PHYSICAL THERAPY | Facility: CLINIC | Age: 53
End: 2023-11-22
Payer: COMMERCIAL

## 2023-11-22 DIAGNOSIS — S46.211S BICEPS MUSCLE TEAR, RIGHT, SEQUELA: Primary | ICD-10-CM

## 2023-11-22 PROCEDURE — 97110 THERAPEUTIC EXERCISES: CPT | Mod: GP | Performed by: PHYSICAL THERAPIST

## 2023-11-22 PROCEDURE — 97140 MANUAL THERAPY 1/> REGIONS: CPT | Mod: GP | Performed by: PHYSICAL THERAPIST

## 2023-11-22 NOTE — PROGRESS NOTES
11/22/23 0500   Appointment Info   Signing clinician's name / credentials Kinsey Dunbar DPT   Total/Authorized Visits 16   Visits Used 11   Medical Diagnosis R distal biceps   Progress Note/Certification   Therapy Frequency 1x/week   Predicted Duration 6 weeks   Progress Note Completed Date 11/22/23   PT Goal 1   Goal Identifier Lifting   Goal Description Be able to lift 30# pain free   Rationale to maximize safety and independence with performance of ADLs and functional tasks   Goal Progress Able to lift 15# pain free   Target Date 01/03/24   Subjective Report   Subjective Report Patient followed up with MD since last visit. Discussed continuing progressive strengthening and loading the muscle more before pursuing an injection for pain.   Objective Measures   Objective Measures Objective Measure 1   Objective Measure 1   Objective Measure Palpation   Details Mild tone with tendernes most present in the central distal with a palpable knot present   Objective Measure 2   Objective Measure Elbow strength   Details Flexion 4+/5 painful, extension 5/5   Objective Measure 3   Objective Measure Elbow ROM   Details WNL   Treatment Interventions (PT)   Interventions Therapeutic Procedure/Exercise;Manual Therapy   Therapeutic Procedure/Exercise   PTRx Ther Proc 1 Elbow Strengthening Flexion with Theraband   PTRx Ther Proc 1 - Details 10x BTB palm p and thumb up   PTRx Ther Proc 2 Elbow Strengthening Extension with Theraband   PTRx Ther Proc 2 - Details 10x BTB   PTRx Ther Proc 3 Wrist Strengthening Isotonic Extension   PTRx Ther Proc 3 - Details Reviewed   PTRx Ther Proc 4 Wrist Strengthening Isotonic Flexion   PTRx Ther Proc 4 - Details Reviewed   PTRx Ther Proc 5 Forearm Strengthening Supination with Theraband   PTRx Ther Proc 5 - Details 10x BTB   Therapeutic Procedures: strength, endurance, ROM, flexibillity minutes (47198) 25   Skilled Intervention Progressive strengthening of biceps and adding more load   Patient  Response/Progress Patient reported no increase in pain or discomfort, noted muscle fatigue   PTRx Ther Proc 6 Shoulder Press Overhead   PTRx Ther Proc 6 - Details 2 x 15   PTRx Ther Proc 7 Supine Dumbbell Chest Press   PTRx Ther Proc 7 - Details 3 x 15   PTRx Ther Proc 8 Shoulder Theraband Rows   PTRx Ther Proc 8 - Details 2 x 15   Manual Therapy   Manual Therapy: Mobilization, MFR, MLD, friction massage minutes (39489) 10   Manual Therapy 1 Myofacial release through medial/distal bicep wrapping into the medial arm   Manual Therapy 1 - Details Moderate tone in distal biceps   Patient Response/Progress decreased discomfort   Plan   Home program Btb with exercises   Updates to plan of care Will continue therapy for 6 more weeks focusing on strengthening and load. If not any better will follow back up with MD for injection.   Plan for next session Increase load on bicep and UE exercises         PLAN  Continue therapy per current plan of care.    Beginning/End Dates of Progress Note Reporting Period:  11/22/23 to 11/22/2023    Referring Provider:  Robe Ontiveros

## 2023-11-29 ENCOUNTER — THERAPY VISIT (OUTPATIENT)
Dept: PHYSICAL THERAPY | Facility: CLINIC | Age: 53
End: 2023-11-29
Payer: COMMERCIAL

## 2023-11-29 DIAGNOSIS — S46.211S BICEPS MUSCLE TEAR, RIGHT, SEQUELA: Primary | ICD-10-CM

## 2023-11-29 PROCEDURE — 97140 MANUAL THERAPY 1/> REGIONS: CPT | Mod: GP | Performed by: PHYSICAL THERAPIST

## 2023-11-29 PROCEDURE — 97110 THERAPEUTIC EXERCISES: CPT | Mod: GP | Performed by: PHYSICAL THERAPIST

## 2024-01-08 PROBLEM — S46.211S BICEPS MUSCLE TEAR, RIGHT, SEQUELA: Status: RESOLVED | Noted: 2023-09-11 | Resolved: 2024-01-08

## 2024-01-08 NOTE — PROGRESS NOTES
11/29/23 0500   Appointment Info   Signing clinician's name / credentials Kinsey Dunbar DPT, Jackie Coffman SPT   Total/Authorized Visits 16   Visits Used 12   Medical Diagnosis R distal biceps   Progress Note/Certification   Therapy Frequency 1x/week   Predicted Duration 6 weeks   Progress Note Completed Date 11/22/23   PT Goal 1   Goal Identifier Lifting   Goal Description Be able to lift 30# pain free   Rationale to maximize safety and independence with performance of ADLs and functional tasks   Goal Progress Able to lift 15# pain free   Target Date 01/03/24   Subjective Report   Subjective Report Patient has decreased the days he lifts and has been trying to rest the arm more over the past few weeks. He says he is having less pain and feeling a lot better.   Objective Measures   Objective Measures Objective Measure 1   Objective Measure 1   Objective Measure Palpation   Details Mild tone with tenderness in distal muscle belly and insertion. Present but improving   Objective Measure 2   Objective Measure Elbow strength   Details Flexion 4+/5 painful, extension 5/5   Objective Measure 3   Objective Measure Elbow ROM   Details WNL   Treatment Interventions (PT)   Interventions Therapeutic Procedure/Exercise;Manual Therapy   Therapeutic Procedure/Exercise   Therapeutic Procedures: strength, endurance, ROM, flexibillity minutes (14003) 30   PTRx Ther Proc 1 Elbow Strengthening Flexion with Theraband   PTRx Ther Proc 1 - Details 2 x 15 BTB palm p and 2 x 15 thumb up   PTRx Ther Proc 2 Elbow Strengthening Extension with Theraband   PTRx Ther Proc 2 - Details No Notes   PTRx Ther Proc 3 Wrist Strengthening Isotonic Extension   PTRx Ther Proc 3 - Details No Notes   PTRx Ther Proc 4 Wrist Strengthening Isotonic Flexion   PTRx Ther Proc 4 - Details No Notes   PTRx Ther Proc 5 Forearm Strengthening Supination with Theraband   PTRx Ther Proc 5 - Details No Notes   PTRx Ther Proc 6 Shoulder Press Overhead   PTRx Ther  Proc 6 - Details 2 x 15 7#   PTRx Ther Proc 7 Supine Dumbbell Chest Press   PTRx Ther Proc 7 - Details 2 x 15 10#   PTRx Ther Proc 8 Shoulder Theraband Rows   PTRx Ther Proc 8 - Details 2 x 15 green   PTRx Ther Proc 9 Shoulder Theraband Low Row/[place]   PTRx Ther Proc 9 - Details 2 x 15 green   Skilled Intervention Progressive strengthening of biceps and adding more load   Patient Response/Progress Patient reported no increase in pain or discomfort, noted muscle fatigue   Manual Therapy   Manual Therapy: Mobilization, MFR, MLD, friction massage minutes (34863) 10   Manual Therapy 1 Myofacial release through medial/distal bicep wrapping into the medial arm   Manual Therapy 1 - Details Moderate tone in distal biceps   Patient Response/Progress decreased discomfort   Plan   Home program Btb with exercises   Updates to plan of care Progressive overload strengthening of biceps and surrounding musculature   Plan for next session Increase reps/weight for exercises   Total Session Time   Timed Code Treatment Minutes 40   Total Treatment Time (sum of timed and untimed services) 40         DISCHARGE  Reason for Discharge: Patient chooses to discontinue therapy.    Equipment Issued:     Discharge Plan: Patient to continue home program.    Referring Provider:  Robe Ontiveros

## 2024-04-08 ENCOUNTER — LAB (OUTPATIENT)
Dept: LAB | Facility: CLINIC | Age: 54
End: 2024-04-08
Payer: COMMERCIAL

## 2024-04-08 DIAGNOSIS — E29.1 HYPOGONADISM MALE: ICD-10-CM

## 2024-04-08 PROCEDURE — 84403 ASSAY OF TOTAL TESTOSTERONE: CPT

## 2024-04-08 PROCEDURE — 36415 COLL VENOUS BLD VENIPUNCTURE: CPT

## 2024-04-10 LAB — TESTOST SERPL-MCNC: 740 NG/DL (ref 240–950)

## 2024-04-10 RX ORDER — TESTOSTERONE CYPIONATE 200 MG/ML
60 INJECTION, SOLUTION INTRAMUSCULAR WEEKLY
Qty: 10 ML | Refills: 5 | Status: SHIPPED | OUTPATIENT
Start: 2024-04-10

## 2024-04-11 ENCOUNTER — TELEPHONE (OUTPATIENT)
Dept: ENDOCRINOLOGY | Facility: CLINIC | Age: 54
End: 2024-04-11

## 2024-04-19 ENCOUNTER — VIRTUAL VISIT (OUTPATIENT)
Dept: ENDOCRINOLOGY | Facility: CLINIC | Age: 54
End: 2024-04-19
Payer: COMMERCIAL

## 2024-04-19 VITALS — BODY MASS INDEX: 37.37 KG/M2 | WEIGHT: 256 LBS

## 2024-04-19 DIAGNOSIS — E29.1 HYPOGONADISM MALE: Primary | ICD-10-CM

## 2024-04-19 PROCEDURE — 99214 OFFICE O/P EST MOD 30 MIN: CPT | Mod: 95 | Performed by: INTERNAL MEDICINE

## 2024-04-19 NOTE — LETTER
4/19/2024         RE: John Joshi  [address]  [place] [address]        Dear Colleague,    Thank you for referring your patient, John Joshi, to the [facility]. Please see a copy of my visit note below.    Virtual Visit Details    Type of service:  Video Visit   Start 10:11 am  End 10:25 am  [facility]                                                                               - Endocrinology follow up -    Reason for visit/consult:  Hypogonadism male    Primary care provider: Ange Dubose      Assessment and Plan  53 year old male with     Secondary hypogonadism - idiopathic    Brain MRI in 11/2023 read as normal looking sella      - continue current reduced dose of  testosterone 60 mg (0.3 ml) injection every week    - recheck testosterone level in 1 year with CBC and PSA    RTC with me in 1 year      30 minutes spent on the date of the encounter doing chart review, history and exam, documentation and further activities as noted above.        Leonora Tobias MD  Staff Physician  Endocrinology and Metabolism  License: [id number]     Interval History as of 4/19/2024 : Patient has been doing well, feeling better, still has some occasional insomnia. Medication compliance: excellent, his wife injecting testosterone.   HPI: A 52 yo male here for the evaluation for hypogonadism. He has had erectile dysfunction, low libido, low energy since 2018, when he underwent reversal of vasectomy around the same time. He and his partner underwent IVF process twice but unsuccessful.   He was prescribed clomid an anastrozole and his total testosterone level improved from 250 to 450s in 4/2022, however his medication ran out, and no medication for the past 1 year and his energy and libido got worse. He has not history of brain injury, no history of alcohol abuse, substance abuse, no history of use of testosterone product in the past.   No HA, no galactorrhea nor gynecomastia.   He  "fathered children with the previous partner. He does weight lifting 4 time a week in the gym.   Other medical history includes mild type 2 DM A1c around 8.       Past Medical/Surgical History:  Past Medical History:   Diagnosis Date     Elevated cholesterol      Hypertriglyceridemia      Past Surgical History:   Procedure Laterality Date     ENT SURGERY      sinus surgery-chronic sinusitis, polypectomy 2001     GENITOURINARY SURGERY      Vasectomy4/1995     VASECTOMY REVERSAL  04/01/2021     wisdom teeth[  2003, 8/2013       Allergies:  Allergies   Allergen Reactions     Percocet [Oxycodone-Acetaminophen] Itching       Current Medications   Current Outpatient Medications   Medication Sig Dispense Refill     Ferrous Sulfate (IRON PO) Take 1 tablet by mouth daily       Needle, Disp, (BD DISP NEEDLES) 18G X 1-1/2\" MISC 1 each once a week (Pharmacy can substitute sizes or brand of needle for what  is in stock) 12 each 5     syringe/needle, disp, (B-D LUER-ZARA SYRINGE) 25G X 1\" 3 ML MISC Inject 1 each into the muscle once a week (Pharmacy can substitute sizes or brand of syringe with needle for what is in stock) 12 each 5     testosterone cypionate (DEPOTESTOSTERONE) 200 MG/ML injection Inject 0.3 mLs (60 mg) into the muscle once a week 10 mL 5     No current facility-administered medications for this visit.       Family History:  Family History   Problem Relation Age of Onset     Endocrine Disease Mother      Cardiovascular Mother         CHF     Genitourinary Problems Mother         ESRD     Heart Disease Mother      Hypertension Mother      Heart Disease Father      C.A.D. Father 60        MI     Genitourinary Problems Father         kidney stone     Cancer Sister 11        leukemia     Psychotic Disorder No family hx of      Thyroid Disease No family hx of      Depression No family hx of        Social History:  Social History     Tobacco Use     Smoking status: Former     Current packs/day: 0.00     Types: Cigarettes "     Quit date: 1995     Years since quittin.9     Smokeless tobacco: Never   Substance Use Topics     Alcohol use: Not Currently       ROS:  Full review of systems taken with the help of the intake sheet. Otherwise a complete 14 point review of systems was taken and is negative unless stated in the history above.        Physical Exam:   Vitals: Wt 116.1 kg (256 lb)   BMI 37.37 kg/m    BMI= Body mass index is 37.37 kg/m .   General: well appearing, no acute distress, pleasant and conversant,   Mental Status/neuro: alert and oriented  Face: symmetrical, normal facial color  Eyes: anicteric, no proptosis or lid lag  Resp: normally breathing      Labs : I reviewed data from epic and extract and summarize the pertinent data here.      Latest Reference Range & Units 01/15/22 11:53 22 10:33 23 08:27 10/09/23 11:25 24 11:53   Testosterone Total 240 - 950 ng/dL 249 435 251 234 (L) 740   (L): Data is abnormally low        MRI Brain: I personally reviewed the original images and agree with the below reports. .    Brain/ Pituitary MRI without and with contrast 2023     History: hypopituitarism; Hypogonadism male.     Comparison:  None available.       Technique: Axial diffusion, axial T2*, and axial T2 FLAIR images of  the whole brain obtained without intravenous contrast. Sagittal  T1-weighted, coronal T2-weighted, coronal T1-weighted images with  focus on the sella were obtained without intravenous contrast. Post  intravenous contrast using gadolinium coronal and sagittal T1-weighted  images were obtained focused on the sella. Dynamic postcontrast  coronal T1-weighted images were also obtained.     Contrast: 11.5 Gadavist IV.     Findings:   No mass is demonstrated within the sella. The pituitary stalk appears  midline. Mildly concave superior border of the pituitary gland, normal  for age. Expected T1 hyperintensity of the neurohypophysis. No focal  abnormal dynamic hypoenhancement. The optic  chiasm is intact and  nondisplaced. Normal major cavernous carotid vascular flow-voids.       No intracranial mass lesion, mass effect, or midline shift. Ventricles  are proportionate to the cerebral sulci. No restricted diffusion. No  intracranial hemorrhage.     Mild right maxillary and right ethmoid sinus mucosal thickening.                                                                      Impression: Normal pituitary gland.         Again, thank you for allowing me to participate in the care of your patient.        Sincerely,        Leonora Tobias MD

## 2024-04-19 NOTE — PROGRESS NOTES
Virtual Visit Details    Type of service:  Video Visit   Start 10:11 am  End 10:25 am  [facility]                                                                               - Endocrinology follow up -    Reason for visit/consult:  Hypogonadism male    Primary care provider: Ange Dubose      Assessment and Plan  53 year old male with     Secondary hypogonadism - idiopathic    Brain MRI in 11/2023 read as normal looking sella      - continue current reduced dose of  testosterone 60 mg (0.3 ml) injection every week    - recheck testosterone level in 1 year with CBC and PSA    RTC with me in 1 year      30 minutes spent on the date of the encounter doing chart review, history and exam, documentation and further activities as noted above.        Leonora Tobias MD  Staff Physician  Endocrinology and Metabolism  License: [id number]     Interval History as of 4/19/2024 : Patient has been doing well, feeling better, still has some occasional insomnia. Medication compliance: excellent, his wife injecting testosterone.   HPI: A 52 yo male here for the evaluation for hypogonadism. He has had erectile dysfunction, low libido, low energy since 2018, when he underwent reversal of vasectomy around the same time. He and his partner underwent IVF process twice but unsuccessful.   He was prescribed clomid an anastrozole and his total testosterone level improved from 250 to 450s in 4/2022, however his medication ran out, and no medication for the past 1 year and his energy and libido got worse. He has not history of brain injury, no history of alcohol abuse, substance abuse, no history of use of testosterone product in the past.   No HA, no galactorrhea nor gynecomastia.   He fathered children with the previous partner. He does weight lifting 4 time a week in the gym.   Other medical history includes mild type 2 DM A1c around 8.       Past Medical/Surgical History:  Past Medical History:   Diagnosis Date    Elevated cholesterol   "   Hypertriglyceridemia      Past Surgical History:   Procedure Laterality Date    ENT SURGERY      sinus surgery-chronic sinusitis, polypectomy     GENITOURINARY SURGERY      Vasectomy1995    VASECTOMY REVERSAL  2021    wisdom teeth[  , 2013       Allergies:  Allergies   Allergen Reactions    Percocet [Oxycodone-Acetaminophen] Itching       Current Medications   Current Outpatient Medications   Medication Sig Dispense Refill    Ferrous Sulfate (IRON PO) Take 1 tablet by mouth daily      Needle, Disp, (BD DISP NEEDLES) 18G X 1-1/2\" MISC 1 each once a week (Pharmacy can substitute sizes or brand of needle for what  is in stock) 12 each 5    syringe/needle, disp, (B-D LUER-ZARA SYRINGE) 25G X 1\" 3 ML MISC Inject 1 each into the muscle once a week (Pharmacy can substitute sizes or brand of syringe with needle for what is in stock) 12 each 5    testosterone cypionate (DEPOTESTOSTERONE) 200 MG/ML injection Inject 0.3 mLs (60 mg) into the muscle once a week 10 mL 5     No current facility-administered medications for this visit.       Family History:  Family History   Problem Relation Age of Onset    Endocrine Disease Mother     Cardiovascular Mother         CHF    Genitourinary Problems Mother         ESRD    Heart Disease Mother     Hypertension Mother     Heart Disease Father     C.A.D. Father 60        MI    Genitourinary Problems Father         kidney stone    Cancer Sister 11        leukemia    Psychotic Disorder No family hx of     Thyroid Disease No family hx of     Depression No family hx of        Social History:  Social History     Tobacco Use    Smoking status: Former     Current packs/day: 0.00     Types: Cigarettes     Quit date: 1995     Years since quittin.9    Smokeless tobacco: Never   Substance Use Topics    Alcohol use: Not Currently       ROS:  Full review of systems taken with the help of the intake sheet. Otherwise a complete 14 point review of systems was taken and is " negative unless stated in the history above.        Physical Exam:   Vitals: Wt 116.1 kg (256 lb)   BMI 37.37 kg/m    BMI= Body mass index is 37.37 kg/m .   General: well appearing, no acute distress, pleasant and conversant,   Mental Status/neuro: alert and oriented  Face: symmetrical, normal facial color  Eyes: anicteric, no proptosis or lid lag  Resp: normally breathing      Labs : I reviewed data from epic and extract and summarize the pertinent data here.      Latest Reference Range & Units 01/15/22 11:53 04/16/22 10:33 07/31/23 08:27 10/09/23 11:25 04/08/24 11:53   Testosterone Total 240 - 950 ng/dL 249 435 251 234 (L) 740   (L): Data is abnormally low        MRI Brain: I personally reviewed the original images and agree with the below reports. .    Brain/ Pituitary MRI without and with contrast 11/6/2023     History: hypopituitarism; Hypogonadism male.     Comparison:  None available.       Technique: Axial diffusion, axial T2*, and axial T2 FLAIR images of  the whole brain obtained without intravenous contrast. Sagittal  T1-weighted, coronal T2-weighted, coronal T1-weighted images with  focus on the sella were obtained without intravenous contrast. Post  intravenous contrast using gadolinium coronal and sagittal T1-weighted  images were obtained focused on the sella. Dynamic postcontrast  coronal T1-weighted images were also obtained.     Contrast: 11.5 Gadavist IV.     Findings:   No mass is demonstrated within the sella. The pituitary stalk appears  midline. Mildly concave superior border of the pituitary gland, normal  for age. Expected T1 hyperintensity of the neurohypophysis. No focal  abnormal dynamic hypoenhancement. The optic chiasm is intact and  nondisplaced. Normal major cavernous carotid vascular flow-voids.       No intracranial mass lesion, mass effect, or midline shift. Ventricles  are proportionate to the cerebral sulci. No restricted diffusion. No  intracranial hemorrhage.     Mild right  maxillary and right ethmoid sinus mucosal thickening.                                                                      Impression: Normal pituitary gland.

## 2024-04-19 NOTE — NURSING NOTE
Is the patient currently in the state of [place]? YES    Visit mode:VIDEO    If the visit is dropped, the patient can be reconnected by: VIDEO VISIT: Text to cell phone:   Telephone Information:   Mobile [phone]       Will anyone else be joining the visit? NO  (If patient encounters technical issues they should call [phone]    How would you like to obtain your AVS? MyChart    Are changes needed to the allergy or medication list? No    Are refills needed on medications prescribed by this physician? YES    Reason for visit: RECHECK and Video Visit    Jacqueline RICE

## 2024-05-09 ENCOUNTER — MYC MEDICAL ADVICE (OUTPATIENT)
Dept: FAMILY MEDICINE | Facility: CLINIC | Age: 54
End: 2024-05-09
Payer: COMMERCIAL

## 2024-05-10 ENCOUNTER — MYC MEDICAL ADVICE (OUTPATIENT)
Dept: ENDOCRINOLOGY | Facility: CLINIC | Age: 54
End: 2024-05-10
Payer: COMMERCIAL

## 2024-06-30 ENCOUNTER — HEALTH MAINTENANCE LETTER (OUTPATIENT)
Age: 54
End: 2024-06-30

## 2024-09-18 NOTE — TELEPHONE ENCOUNTER
Action 9/18/2024   Action Taken 1) Called patient - patient had left shoulder injury around 25 years ago. No treatment or imaging since.      REASON FOR VISIT: Left shoulder   DATE OF APPT: 10/03/2024   NOTES (FOR ALL VISITS) STATUS DETAILS   OFFICE NOTE from referring provider N/A    MEDICATION LIST Internal    IMAGING  (FOR ALL VISITS)     XR N/A    MRI (HEAD, NECK, SPINE) N/A    CT (HEAD, NECK, SPINE) N/A

## 2024-10-02 DIAGNOSIS — M25.512 CHRONIC LEFT SHOULDER PAIN: Primary | ICD-10-CM

## 2024-10-02 DIAGNOSIS — G89.29 CHRONIC LEFT SHOULDER PAIN: Primary | ICD-10-CM

## 2024-10-03 ENCOUNTER — PRE VISIT (OUTPATIENT)
Dept: ORTHOPEDICS | Facility: CLINIC | Age: 54
End: 2024-10-03

## 2024-10-03 ENCOUNTER — ANCILLARY PROCEDURE (OUTPATIENT)
Dept: GENERAL RADIOLOGY | Facility: CLINIC | Age: 54
End: 2024-10-03
Attending: FAMILY MEDICINE
Payer: COMMERCIAL

## 2024-10-03 ENCOUNTER — VIRTUAL VISIT (OUTPATIENT)
Dept: ORTHOPEDICS | Facility: CLINIC | Age: 54
End: 2024-10-03
Payer: COMMERCIAL

## 2024-10-03 DIAGNOSIS — G89.29 CHRONIC LEFT SHOULDER PAIN: Primary | ICD-10-CM

## 2024-10-03 DIAGNOSIS — M25.512 CHRONIC LEFT SHOULDER PAIN: ICD-10-CM

## 2024-10-03 DIAGNOSIS — G89.29 CHRONIC LEFT SHOULDER PAIN: ICD-10-CM

## 2024-10-03 DIAGNOSIS — M25.512 CHRONIC LEFT SHOULDER PAIN: Primary | ICD-10-CM

## 2024-10-03 PROCEDURE — 73030 X-RAY EXAM OF SHOULDER: CPT | Mod: LT | Performed by: RADIOLOGY

## 2024-10-03 PROCEDURE — 99214 OFFICE O/P EST MOD 30 MIN: CPT | Mod: 95 | Performed by: FAMILY MEDICINE

## 2024-10-03 NOTE — LETTER
10/3/2024      John Joshi  [address]  [place] [address]      Dear Colleague,    Thank you for referring your patient, John Joshi, to the [facility] [place]. Please see a copy of my visit note below.    John is a 54 year old who is being evaluated via a billable video visit.      Assessment & Plan    Chronic left shoulder pain  His pain is chronic and unrelenting despite conservative care.  I do think an MRI is most appropriate at this time.  He can have this done at his earliest convenience, I will get in touch with him with the results.  - MR Shoulder Left w/o Contrast; Future    It was a pleasure talking to John today.    Giorgio Alfred, DO CAQSM    Subjective  John is a 54 year old gentleman here with left shoulder pain.  John has shoulder pain that is longstanding, he suffered an injury as a young man in which his arm was abducted forcefully, he felt and heard a pop in his shoulder.  This was treated nonoperatively.  His shoulder pain had resolved for some time, although has been bothering him quite a bit over the past many months.  He has been engaging in home-based exercises and has been taking anti-inflammatories, using a TENS unit, and icing.  Unfortunately his pain is still pretty severe.  He feels this pain when he brings his arm up or across.          Objective      Vitals:  No vitals were obtained today due to virtual visit.    Physical Exam   GENERAL: alert and no distress  EYES: Eyes grossly normal to inspection.  No discharge or erythema, or obvious scleral/conjunctival abnormalities.  RESP: No audible wheeze, cough, or visible cyanosis.    NEURO: Cranial nerves grossly intact.  Mentation and speech appropriate for age.  PSYCH: Appropriate affect, tone, and pace of words          Visit Details    Type of service: Telephone visit   Originating Location (pt. Location): Home    Distant Location (provider location):  Off-site  Platform used for Video  Visit: Kiki  Signed Electronically by: Dallas Alfred DO        Again, thank you for allowing me to participate in the care of your patient.        Sincerely,        Dallas Alfred DO

## 2024-10-03 NOTE — PROGRESS NOTES
John is a 54 year old who is being evaluated via a billable video visit.      Assessment & Plan     Chronic left shoulder pain  His pain is chronic and unrelenting despite conservative care.  I do think an MRI is most appropriate at this time.  He can have this done at his earliest convenience, I will get in touch with him with the results.  - MR Shoulder Left w/o Contrast; Future    It was a pleasure talking to John today.    Giorgio Alfred DO CAQSM    Subjective   John is a 54 year old gentleman here with left shoulder pain.  John has shoulder pain that is longstanding, he suffered an injury as a young man in which his arm was abducted forcefully, he felt and heard a pop in his shoulder.  This was treated nonoperatively.  His shoulder pain had resolved for some time, although has been bothering him quite a bit over the past many months.  He has been engaging in home-based exercises and has been taking anti-inflammatories, using a TENS unit, and icing.  Unfortunately his pain is still pretty severe.  He feels this pain when he brings his arm up or across.          Objective       Vitals:  No vitals were obtained today due to virtual visit.    Physical Exam   GENERAL: alert and no distress  EYES: Eyes grossly normal to inspection.  No discharge or erythema, or obvious scleral/conjunctival abnormalities.  RESP: No audible wheeze, cough, or visible cyanosis.    NEURO: Cranial nerves grossly intact.  Mentation and speech appropriate for age.  PSYCH: Appropriate affect, tone, and pace of words          Visit Details    Type of service: Telephone visit   Originating Location (pt. Location): Home    Distant Location (provider location):  Off-site  Platform used for Video Visit: Kiki  Signed Electronically by: Dallas Alfred DO

## 2024-10-03 NOTE — LETTER
10/3/2024      John Joshi  [address]  [place] [address]      Dear Colleague,    Thank you for referring your patient, John Joshi, to the [facility] [place]. Please see a copy of my visit note below.    John is a 54 year old who is being evaluated via a billable video visit.      Assessment & Plan    Chronic left shoulder pain  His pain is chronic and unrelenting despite conservative care.  I do think an MRI is most appropriate at this time.  He can have this done at his earliest convenience, I will get in touch with him with the results.  - MR Shoulder Left w/o Contrast; Future    It was a pleasure talking to John today.    Giorgio Alfred, DO CAQSM    Subjective  John is a 54 year old gentleman here with left shoulder pain.  John has shoulder pain that is longstanding, he suffered an injury as a young man in which his arm was abducted forcefully, he felt and heard a pop in his shoulder.  This was treated nonoperatively.  His shoulder pain had resolved for some time, although has been bothering him quite a bit over the past many months.  He has been engaging in home-based exercises and has been taking anti-inflammatories, using a TENS unit, and icing.  Unfortunately his pain is still pretty severe.  He feels this pain when he brings his arm up or across.          Objective      Vitals:  No vitals were obtained today due to virtual visit.    Physical Exam   GENERAL: alert and no distress  EYES: Eyes grossly normal to inspection.  No discharge or erythema, or obvious scleral/conjunctival abnormalities.  RESP: No audible wheeze, cough, or visible cyanosis.    NEURO: Cranial nerves grossly intact.  Mentation and speech appropriate for age.  PSYCH: Appropriate affect, tone, and pace of words          Visit Details    Type of service: Telephone visit   Originating Location (pt. Location): Home    Distant Location (provider location):  Off-site  Platform used for Video  Visit: Kiki  Signed Electronically by: Dallas Alfred DO        Again, thank you for allowing me to participate in the care of your patient.        Sincerely,        Dallas Alfred DO

## 2024-10-09 DIAGNOSIS — E29.1 HYPOGONADISM MALE: ICD-10-CM

## 2024-10-09 RX ORDER — TESTOSTERONE CYPIONATE 200 MG/ML
60 INJECTION, SOLUTION INTRAMUSCULAR WEEKLY
Qty: 10 ML | Refills: 5 | Status: SHIPPED | OUTPATIENT
Start: 2024-10-09

## 2024-10-09 NOTE — TELEPHONE ENCOUNTER
TESTOSTERONE  MG/ML       Last Written Prescription Date:  4-10-24  Last Fill Quantity: 10 ml,   # refills: 5  Last Office Visit : 4-19-24  Future Office visit:  5-2-25    Routing refill request to provider for review/approval because:  Drug not on the [facility], [facility] or [facility] refill protocol or controlled substance  Overdue: BP documentation, over due labs: ALT,AST, PSA hematocrit , LIPID PANEL

## 2024-10-11 ENCOUNTER — TELEPHONE (OUTPATIENT)
Dept: ENDOCRINOLOGY | Facility: CLINIC | Age: 54
End: 2024-10-11
Payer: COMMERCIAL

## 2024-10-11 NOTE — CONFIDENTIAL NOTE
Received notice via [employer] request that patient needs a Prior Authorization on his Depotestosterone.     Will send to prior authorization team to review/complete.      Joan Ng RN  Endocrine Care Coordinator  [facility]

## 2024-10-15 NOTE — TELEPHONE ENCOUNTER
PA Initiation    Medication: TESTOSTERONE CYPIONATE 200 MG/ML IM SOLN  Insurance Company: [employer] - Phone [phone] Fax [phone]  Pharmacy Filling the Rx: [id number] - [place], [place] - [address]  Filling Pharmacy Phone: [phone]  Filling Pharmacy Fax: [phone]  Start Date: 10/15/2024

## 2024-10-16 NOTE — TELEPHONE ENCOUNTER
Per message from pt I put in for 100mg/ml instead of 200mg/ml per call to insurance we will need to start a PA over the phone for the correct strength of Testosterone 200mg/ml questions answered Turn around time 24-72 hours. PA # [id number]

## 2024-10-16 NOTE — TELEPHONE ENCOUNTER
Prior Authorization Approval    Medication: TESTOSTERONE CYPIONATE 200 MG/ML IM SOLN  Authorization Effective Date: 10/15/2024  Authorization Expiration Date: 10/15/2025  Approved Dose/Quantity: Max daily dosage of 0.36  Reference #: [id number]   Insurance Company: [employer] - Phone [phone] Fax [phone]  Which Pharmacy is filling the prescription: [id number] - [place] - [address]  Pharmacy Notified: YES  Patient Notified: YES

## 2024-11-01 ENCOUNTER — ANCILLARY PROCEDURE (OUTPATIENT)
Dept: MRI IMAGING | Facility: CLINIC | Age: 54
End: 2024-11-01
Attending: FAMILY MEDICINE
Payer: COMMERCIAL

## 2024-11-01 DIAGNOSIS — G89.29 CHRONIC LEFT SHOULDER PAIN: ICD-10-CM

## 2024-11-01 DIAGNOSIS — M25.512 CHRONIC LEFT SHOULDER PAIN: ICD-10-CM

## 2024-11-01 PROCEDURE — 73221 MRI JOINT UPR EXTREM W/O DYE: CPT | Mod: LT | Performed by: RADIOLOGY

## 2024-11-03 DIAGNOSIS — E29.1 HYPOGONADISM MALE: ICD-10-CM

## 2024-11-06 RX ORDER — NEEDLES, DISPOSABLE 25GX5/8"
NEEDLE, DISPOSABLE MISCELLANEOUS
Qty: 12 EACH | Refills: 1 | Status: SHIPPED | OUTPATIENT
Start: 2024-11-06

## 2024-11-06 RX ORDER — SYRINGE WITH NEEDLE, 1 ML 25GX5/8"
SYRINGE, EMPTY DISPOSABLE MISCELLANEOUS
Qty: 12 EACH | Refills: 17 | Status: SHIPPED | OUTPATIENT
Start: 2024-11-06

## 2024-11-13 ENCOUNTER — OFFICE VISIT (OUTPATIENT)
Dept: DERMATOLOGY | Facility: CLINIC | Age: 54
End: 2024-11-13
Payer: COMMERCIAL

## 2024-11-13 DIAGNOSIS — B35.6 TINEA CRURIS: ICD-10-CM

## 2024-11-13 DIAGNOSIS — D18.01 CHERRY ANGIOMA: ICD-10-CM

## 2024-11-13 DIAGNOSIS — L81.4 LENTIGINES: ICD-10-CM

## 2024-11-13 DIAGNOSIS — L82.1 SK (SEBORRHEIC KERATOSIS): ICD-10-CM

## 2024-11-13 DIAGNOSIS — L24.9 IRRITANT DERMATITIS: Primary | ICD-10-CM

## 2024-11-13 DIAGNOSIS — D22.9 MULTIPLE BENIGN NEVI: ICD-10-CM

## 2024-11-13 DIAGNOSIS — B07.8 FLAT WART: ICD-10-CM

## 2024-11-13 DIAGNOSIS — D49.2 NEOPLASM OF SKIN: ICD-10-CM

## 2024-11-13 RX ORDER — KETOCONAZOLE 20 MG/G
CREAM TOPICAL DAILY
Qty: 60 G | Refills: 1 | Status: SHIPPED | OUTPATIENT
Start: 2024-11-13

## 2024-11-13 RX ORDER — PIMECROLIMUS 10 MG/G
CREAM TOPICAL 2 TIMES DAILY
Qty: 60 G | Refills: 2 | Status: SHIPPED | OUTPATIENT
Start: 2024-11-13

## 2024-11-13 ASSESSMENT — PAIN SCALES - GENERAL: PAINLEVEL_OUTOF10: WORST PAIN (10)

## 2024-11-13 NOTE — NURSING NOTE
"John Joshi's goals for this visit include:   Chief Complaint   Patient presents with    Derm Problem     Pt here for FBSC. He has spot check on left ear. Also two spots on penis, and red spots all over body. Pt wondering about body itchiness.    From [employer] message: \"Darkspots, red dots all over, odd itchy growths on head, two odd spots on penis, years of groin itch I cannot get ride of.\"       He requests these members of his care team be copied on today's visit information:     PCP: Ange Dubose    Referring Provider:  Referred Self, MD  No address on file    There were no vitals taken for this visit.    Do you need any medication refills at today's visit?   Jeanette Navarrete on 11/13/2024 at 7:16 AM      "

## 2024-11-13 NOTE — PROGRESS NOTES
[facility] Dermatology Note  Encounter Date: Nov 13, 2024  Office Visit      Dermatology Problem List:  FBSE: 11/13/24    #NUB L shoulder, S/p Biopsy performed on 11/13/24, pending results   1. Groin rash Ddx: irritant derm vs tinea cruris   - Tx: Ketoconazole cream & Elidel   2. Flat wart   - Tx: OTC wart treatment  ____________________________________________    Assessment & Plan:  # Neoplasm of unspecified behavior of the skin (D49.2) on the L shoulder . The differential diagnosis includes MIS vs DN vs other.   - Shave biopsy performed today, see procedure note below.   - Photographed today     # Flat Wart, L palm  - Discussed the etiology of this condition as well as treatment and their side effects  - Recommended use of OTC wart treatment .    # Groin rash , Ddx: irritant dermatitis vs Tinea Cruris Chronic  - Discussed the etiology of this condition as well as treatment and their side effects  - Start on Ketoconazole cream , apply topically  - Start on Elidel cream, apply topically.   - RTC if groin rash does not resolve.     # Benign findings: multiple benign nevi, lentigines, cherry angiomas, SKs  - edu on benign etiology  - Signs and Symptoms of non-melanoma skin cancer and ABCDEs of melanoma reviewed with patient. Patient encouraged to perform monthly self skin exams and educated on how to perform them. UV precautions reviewed with patient. Patient was asked about new or changing moles/lesions on body.   - Sunscreen: Apply 20 minutes prior to going outdoors and reapply every two hours, when wet or sweating. We recommend using an SPF 30 or higher, and to use one that is water resistant.     - RTC for changes     Procedures Performed:   - Shave biopsy procedure note, location(s): see above. After discussion of benefits and risks including but not limited to bleeding, infection, scar, incomplete removal, recurrence, and non-diagnostic biopsy, written consent and photographs were obtained.  "The area was cleaned with isopropyl alcohol. 0.5mL of 1% lidocaine with epinephrine was injected to obtain adequate anesthesia of lesion(s). Shave biopsy at site(s) performed. Hemostasis was achieved with aluminium chloride. Petrolatum ointment and a sterile dressing were applied. The patient tolerated the procedure and no complications were noted. The patient was provided with verbal and written post care instructions.      Follow-up: pending path results    Staff and scribe:    Scribe Disclosure:   I, LILY CARVAJAL, am serving as a scribe; to document services personally performed by Mallory Vieira PA-C -based on data collection and the provider's statements to me.     Provider Disclosure:  I agree with above History, Review of Systems, Physical exam and Plan.  I have reviewed the content of the documentation and have edited it as needed. I have personally performed the services documented here and the documentation accurately represents those services and the decisions I have made.      Electronically signed by:      All risks, benefits and alternatives were discussed with patient.  Patient is in agreement and understands the assessment and plan.  All questions were answered.    Mallory Vieira PA-C, [facility]  [facility]: Phone: [phone], Fax: [phone]  [facility]: Phone: [phone],  Fax: [phone]  [facility]: Phone: [phone], Fax: [phone]  ____________________________________________    CC: Derm Problem (Pt here for FBSC. He has spot check on left ear. Also two spots on penis, and red spots all over body. Pt wondering about body itchiness.//From [employer]ivo message: \"Darkspots, red dots all over, odd itchy growths on head, two odd spots on penis, years of groin itch I cannot get ride of.\")      Reviewed patients past medical history and pertinent chart review prior to patient's visit " "today.     HPI:  Mr. John Joshi is a 54 year old male who presents today as a return patient for [facility].     Today patient reported a rash on his groin. Notes he has had this rash for a \"decade\" and reports it never fully gets better even with treatment. Rash does not extend to his buttock or scrotum.     Patient is otherwise feeling well, without additional concerns.    Labs:  N/A    Physical Exam:  Vitals: There were no vitals taken for this visit.  SKIN: Total skin excluding the undergarment areas was performed. The exam included the head/face, neck, both arms, chest, back, abdomen, both legs, digits and/or nails.    -  Becker's skin type II, has <100 nevi  - There are dome shaped bright red papules on the trunk.   - Multiple regular brown pigmented macules and papules are identified on the trunk and extremities.   - Scattered brown macules on sun exposed areas.  - There are waxy stuck on tan to brown papules on the trunk.     - There is a verrucous papule with thrombosed capillaries interrupting dermatoglyphics on the: x 1 L palm .  - L shoulder there is a 4 mm dark brown asymmetrical macule   - No other lesions of concern on areas examined.     Medications:  Current Outpatient Medications   Medication Sig Dispense Refill    Ferrous Sulfate (IRON PO) Take 1 tablet by mouth daily      needle, disp, (BD HYPODERMIC NEEDLE) 18G X 1\" MISC USE 1 EACH ONCE A WEEK (PHARMACY CAN SUBSTITUTE SIZES OR BRAND OF NEEDLE FOR WHAT IS IN STOCK) 12 each 1    syringe/needle, disp, (B-D LUER-ZARA SYRINGE) 25G X 1\" 3 ML MISC INJECT 1 EACH INTO THE MUSCLE ONCE A WEEK (PHARMACY CAN SUB SIZES BRAND OF SYRINGE WITH NEEDLE) 12 each 17    testosterone cypionate (DEPOTESTOSTERONE) 200 MG/ML injection INJECT 0.3 MLS (60 MG) INTO THE MUSCLE ONCE A WEEK 10 mL 5     No current facility-administered medications for this visit.      Past Medical/Surgical History:   Patient Active Problem List   Diagnosis    CARDIOVASCULAR SCREENING; LDL GOAL " LESS THAN 130    Routine medical exam     Past Medical History:   Diagnosis Date    Elevated cholesterol     Hypertriglyceridemia

## 2024-11-13 NOTE — LETTER
11/13/2024      John Joshi  [address]  [place] [address]      Dear Colleague,    Thank you for referring your patient, John Joshi, to the [facility]. Please see a copy of my visit note below.    [facility] Dermatology Note  Encounter Date: Nov 13, 2024  Office Visit      Dermatology Problem List:  FBSE: 11/13/24    #NUB L shoulder, S/p Biopsy performed on 11/13/24, pending results   1. Groin rash Ddx: irritant derm vs tinea cruris   - Tx: Ketoconazole cream & Elidel   2. Flat wart   - Tx: OTC wart treatment  ____________________________________________    Assessment & Plan:  # Neoplasm of unspecified behavior of the skin (D49.2) on the L shoulder . The differential diagnosis includes MIS vs DN vs other.   - Shave biopsy performed today, see procedure note below.   - Photographed today     # Flat Wart, L palm  - Discussed the etiology of this condition as well as treatment and their side effects  - Recommended use of OTC wart treatment .    # Groin rash , Ddx: irritant dermatitis vs Tinea Cruris Chronic  - Discussed the etiology of this condition as well as treatment and their side effects  - Start on Ketoconazole cream , apply topically  - Start on Elidel cream, apply topically.   - RTC if groin rash does not resolve.     # Benign findings: multiple benign nevi, lentigines, cherry angiomas, SKs  - edu on benign etiology  - Signs and Symptoms of non-melanoma skin cancer and ABCDEs of melanoma reviewed with patient. Patient encouraged to perform monthly self skin exams and educated on how to perform them. UV precautions reviewed with patient. Patient was asked about new or changing moles/lesions on body.   - Sunscreen: Apply 20 minutes prior to going outdoors and reapply every two hours, when wet or sweating. We recommend using an SPF 30 or higher, and to use one that is water resistant.     - RTC for changes     Procedures Performed:   - Shave biopsy  procedure note, location(s): see above. After discussion of benefits and risks including but not limited to bleeding, infection, scar, incomplete removal, recurrence, and non-diagnostic biopsy, written consent and photographs were obtained. The area was cleaned with isopropyl alcohol. 0.5mL of 1% lidocaine with epinephrine was injected to obtain adequate anesthesia of lesion(s). Shave biopsy at site(s) performed. Hemostasis was achieved with aluminium chloride. Petrolatum ointment and a sterile dressing were applied. The patient tolerated the procedure and no complications were noted. The patient was provided with verbal and written post care instructions.      Follow-up: pending path results    Staff and scribe:    Scribe Disclosure:   I, LILY CARVAJAL, am serving as a scribe; to document services personally performed by Mallory Vieira PA-C -based on data collection and the provider's statements to me.     Provider Disclosure:  I agree with above History, Review of Systems, Physical exam and Plan.  I have reviewed the content of the documentation and have edited it as needed. I have personally performed the services documented here and the documentation accurately represents those services and the decisions I have made.      Electronically signed by:      All risks, benefits and alternatives were discussed with patient.  Patient is in agreement and understands the assessment and plan.  All questions were answered.    Mallory Vieira PA-C, MPAS  [facility]: Phone: [phone], Fax: [phone]  [facility]: Phone: [phone],  Fax: [phone]  [facility]: Phone: [phone], Fax: [phone]  ____________________________________________    CC: Derm Problem (Pt here for FBSC. He has spot check on left ear. Also two spots on penis, and red spots all over body. Pt wondering about body  "itchiness.//From i'mma message: \"Darkspots, red dots all over, odd itchy growths on head, two odd spots on penis, years of groin itch I cannot get ride of.\")      Reviewed patients past medical history and pertinent chart review prior to patient's visit today.     HPI:  Mr. John Joshi is a 54 year old male who presents today as a return patient for [facility].     Today patient reported a rash on his groin. Notes he has had this rash for a \"decade\" and reports it never fully gets better even with treatment. Rash does not extend to his buttock or scrotum.     Patient is otherwise feeling well, without additional concerns.    Labs:  N/A    Physical Exam:  Vitals: There were no vitals taken for this visit.  SKIN: Total skin excluding the undergarment areas was performed. The exam included the head/face, neck, both arms, chest, back, abdomen, both legs, digits and/or nails.    -  Becker's skin type II, has <100 nevi  - There are dome shaped bright red papules on the trunk.   - Multiple regular brown pigmented macules and papules are identified on the trunk and extremities.   - Scattered brown macules on sun exposed areas.  - There are waxy stuck on tan to brown papules on the trunk.     - There is a verrucous papule with thrombosed capillaries interrupting dermatoglyphics on the: x 1 L palm .  - L shoulder there is a 4 mm dark brown asymmetrical macule   - No other lesions of concern on areas examined.     Medications:  Current Outpatient Medications   Medication Sig Dispense Refill     Ferrous Sulfate (IRON PO) Take 1 tablet by mouth daily       needle, disp, (BD HYPODERMIC NEEDLE) 18G X 1\" MISC USE 1 EACH ONCE A WEEK (PHARMACY CAN SUBSTITUTE SIZES OR BRAND OF NEEDLE FOR WHAT IS IN STOCK) 12 each 1     syringe/needle, disp, (B-D LUER-ZARA SYRINGE) 25G X 1\" 3 ML MISC INJECT 1 EACH INTO THE MUSCLE ONCE A WEEK (PHARMACY CAN SUB SIZES BRAND OF SYRINGE WITH NEEDLE) 12 each 17     testosterone cypionate (DEPOTESTOSTERONE) " 200 MG/ML injection INJECT 0.3 MLS (60 MG) INTO THE MUSCLE ONCE A WEEK 10 mL 5     No current facility-administered medications for this visit.      Past Medical/Surgical History:   Patient Active Problem List   Diagnosis     CARDIOVASCULAR SCREENING; LDL GOAL LESS THAN 130     Routine medical exam     Past Medical History:   Diagnosis Date     Elevated cholesterol      Hypertriglyceridemia                         Again, thank you for allowing me to participate in the care of your patient.        Sincerely,        Mallory Vieira PA-C

## 2024-11-13 NOTE — PATIENT INSTRUCTIONS
Patient Education       Proper skin care from [facility]:    -Eliminate harsh soaps as they strip the natural oils from the skin, often resulting in dry itchy skin ( i.e. Dial, Zest, [place])  -Use mild soaps such as Cetaphil or Dove Sensitive Skin in the shower. You do not need to use soap on arms, legs, and trunk every time you shower unless visibly soiled.   -Avoid hot or cold showers.  -After showering, lightly dry off and apply moisturizing within 2-3 minutes. This will help trap moisture in the skin.   -Aggressive use of a moisturizer at least 1-2 times a day to the entire body (including -Vanicream, Cetaphil, Aquaphor or Cerave) and moisturize hands after every washing.  -We recommend using moisturizers that come in a tub that needs to be scooped out, not a pump. This has more of an oil base. It will hold moisture in your skin much better than a water base moisturizer. The above recommended are non-pore clogging.      Wear a sunscreen with at least SPF 30 on your face, ears, neck and V of the chest daily. Wear sunscreen on other areas of the body if those areas are exposed to the sun throughout the day. Sunscreens can contain physical and/or chemical blockers. Physical blockers are less likely to clog pores, these include zinc oxide and titanium dioxide. Reapply every two hour and after swimming.     Sunscreen examples: https://www.ewg.org/sunscreen/    UV radiation  UVA radiation remains constant throughout the day and throughout the year. It is a longer wavelength than UVB and therefore penetrates deeper into the skin leading to immediate and delayed tanning, photoaging, and skin cancer. 70-80% of UVA and UVB radiation occurs between the hours of 10am-2pm.  UVB radiation  UVB radiation causes the most harmful effects and is more significant during the summer months. However, snow and ice can reflect UVB radiation leading to skin damage during the winter months as well. UVB radiation is  responsible for tanning, burning, inflammation, delayed erythema (pinkness), pigmentation (brown spots), and skin cancer.     I recommend self monthly full body exams and yearly full body exams with a dermatology provider. If you develop a new or changing lesion please follow up for examination. Most skin cancers are pink and scaly or pink and pearly. However, we do see blue/brown/black skin cancers.  Consider the ABCDEs of melanoma when giving yourself your monthly full body exam ( don't forget the groin, buttocks, feet, toes, etc). A-asymmetry, B-borders, C-color, D-diameter, E-elevation or evolving. If you see any of these changes please follow up in clinic. If you cannot see your back I recommend purchasing a hand held mirror to use with a larger wall mirror.       Checking for Skin Cancer  You can find cancer early by checking your skin each month. There are 3 kinds of skin cancer. They are melanoma, basal cell carcinoma, and squamous cell carcinoma. Doing monthly skin checks is the best way to find new marks or skin changes. Follow the instructions below for checking your skin.   The ABCDEs of checking moles for melanoma   Check your moles or growths for signs of melanoma using ABCDE:   Asymmetry: the sides of the mole or growth don t match  Border: the edges are ragged, notched, or blurred  Color: the color within the mole or growth varies  Diameter: the mole or growth is larger than 6 mm (size of a pencil eraser)  Evolving: the size, shape, or color of the mole or growth is changing (evolving is not shown in the images below)    Checking for other types of skin cancer  Basal cell carcinoma or squamous cell carcinoma have symptoms such as:     A spot or mole that looks different from all other marks on your skin  Changes in how an area feels, such as itching, tenderness, or pain  Changes in the skin's surface, such as oozing, bleeding, or scaliness  A sore that does not heal  New swelling or redness beyond  the border of a mole    Who s at risk?  Anyone can get skin cancer. But you are at greater risk if you have:   Fair skin, light-colored hair, or light-colored eyes  Many moles or abnormal moles on your skin  A history of sunburns from sunlight or tanning beds  A family history of skin cancer  A history of exposure to radiation or chemicals  A weakened immune system  If you have had skin cancer in the past, you are at risk for recurring skin cancer.   How to check your skin  Do your monthly skin checkups in front of a full-length mirror. Check all parts of your body, including your:   Head (ears, face, neck, and scalp)  Torso (front, back, and sides)  Arms (tops, undersides, upper, and lower armpits)  Hands (palms, backs, and fingers, including under the nails)  Buttocks and genitals  Legs (front, back, and sides)  Feet (tops, soles, toes, including under the nails, and between toes)  If you have a lot of moles, take digital photos of them each month. Make sure to take photos both up close and from a distance. These can help you see if any moles change over time.   Most skin changes are not cancer. But if you see any changes in your skin, call your doctor right away. Only he or she can diagnose a problem. If you have skin cancer, seeing your doctor can be the first step toward getting the treatment that could save your life.   [employer] last reviewed this educational content on 4/1/2019 2000-2020 [employer]. [address] [place] [address], [place] [address]. All rights reserved. This information is not intended as a substitute for professional medical care. Always follow your healthcare professional's instructions.         .Wound Care After a Biopsy    What is a skin biopsy?  A skin biopsy allows the doctor to examine a very small piece of tissue under the microscope to determine the diagnosis and the best treatment for the skin condition. A local anesthetic (numbing medicine)  is injected with a very small  needle into the skin area to be tested. A small piece of skin is taken from the area. Sometimes a suture (stitch) is used.     What are the risks of a skin biopsy?  I will experience scar, bleeding, swelling, pain, crusting and redness. I may experience incomplete removal or recurrence. Risks of this procedure are excessive bleeding, bruising, infection, nerve damage, numbness, thick (hypertrophic or keloidal) scar and non-diagnostic biopsy.    How should I care for my wound for the first 24 hours?  Keep the wound dry and covered for 24 hours  If it bleeds, hold direct pressure on the area for 15 minutes. If bleeding does not stop then go to the emergency room  Avoid strenuous exercise the first 1-2 days or as your doctor instructs you    How should I care for the wound after 24 hours?  After 24 hours, remove the bandage  You may bathe or shower as normal  If you had a scalp biopsy, you can shampoo as usual and can use shower water to clean the biopsy site daily  Clean the wound twice a day with gentle soap and water  Do not scrub, be gentle  Apply white petroleum/Vaseline after cleaning the wound with a cotton swab or a clean finger, and keep the site covered with a Bandaid /bandage. Bandages are not necessary with a scalp biopsy  If you are unable to cover the site with a Bandaid /bandage, re-apply ointment 2-3 times a day to keep the site moist. Moisture will help with healing  Avoid strenuous activity for first 1-2 days  Avoid lakes, rivers, pools, and oceans until the stitches are removed or the site is healed    How do I clean my wound?  Wash hands thoroughly with soap or use hand  before all wound care  Clean the wound with gentle soap and water  Apply white petroleum/Vaseline  to wound after it is clean  Replace the Bandaid /bandage to keep the wound covered for the first few days or as instructed by your doctor  If you had a scalp biopsy, warm shower water to the area on a daily basis should  suffice    What should I use to clean my wound?   Cotton-tipped applicators (Qtips )  White petroleum jelly (Vaseline ). Use a clean new container and use Q-tips to apply.  Bandaids   as needed  Gentle soap     How should I care for my wound long term?  Do not get your wound dirty  Keep up with wound care for one week or until the area is healed.  A small scab will form and fall off by itself when the area is completely healed. The area will be red and will become pink in color as it heals. Sun protection is very important for how your scar will turn out. Sunscreen with an SPF 30 or greater is recommended once the area is healed.  If you have stitches, stitches need to be removed in 10 days. You may return to our clinic for this or you may have it done locally at your doctor s office.  You should have some soreness but it should be mild and slowly go away over several days. Talk to your doctor about using tylenol for pain,    When should I call my doctor?  If you have increased:   Pain or swelling  Pus or drainage (clear or slightly yellow drainage is ok)  Temperature over 100F  Spreading redness or warmth around wound    When will I hear about my results?  The biopsy results can take 2-3 weeks to come back. The clinic will call you with the results, send you a [facility] message, or have you schedule a follow-up clinic or phone time to discuss the results. Contact our clinics if you do not hear from us in 3 weeks.     Who should I call with questions?  [facility]: [phone]   [facility]: [phone]  For urgent needs outside of business hours call the [facility] at [phone] and ask for the dermatology resident on call

## 2024-11-18 ENCOUNTER — TELEPHONE (OUTPATIENT)
Dept: DERMATOLOGY | Facility: CLINIC | Age: 54
End: 2024-11-18
Payer: COMMERCIAL

## 2024-11-18 LAB
PATH REPORT.COMMENTS IMP SPEC: ABNORMAL
PATH REPORT.COMMENTS IMP SPEC: YES
PATH REPORT.FINAL DX SPEC: ABNORMAL
PATH REPORT.GROSS SPEC: ABNORMAL
PATH REPORT.MICROSCOPIC SPEC OTHER STN: ABNORMAL
PATH REPORT.RELEVANT HX SPEC: ABNORMAL

## 2024-11-18 NOTE — TELEPHONE ENCOUNTER
Excision/Mohs previsit information                                                    Diagnosis: melanoma in-situ  Site(s): L shoulder     Is the surgical site below the waist?  NO  If yes, instruct the patient to purchase over the counter chlorhexidine surgical soap and wash all skin below the belly button twice before surgery    Allergies   Allergen Reactions    Percocet [Oxycodone-Acetaminophen] Itching       Review and update allergy and medication list    Do you take the following medications:  Coumadin, Eliquis, Pradaxa, Xarelto:  NO.  If on Coumadin, INR should be checked within 7 days of surgery.  Range should be 3.5 or less or within therapeutic range.    Do you currently or have you previously had any of the following conditions:  Hepatitis:  NO  HIV/AIDS:  NO  Prolonged bleeding or bleeding disorder:  NO  Pacemaker: NO  Defibrillator:  NO  History of artificial or heart valve replacement:  NO  Endocarditis (inflammation of the inner lining of the heart's chambers and valves):  NO  Have you ever had a prosthetic joint infection:  NO  Pregnant or Breastfeeding:  N/A  Mobility device (wheelchair, transfer difficulty): NO    Important Reminders:                                                      -For Mohs, notify patient they may be here through the lunch hour.  Be prepared to have down time and we recommend they bring a book or something to do.  -Ok to take all of their medications as prescribed  -Notify patient to eat prior to appointment.  The medication is more likely to cause you to feel jittery if you have an empty stomach.  -If face is being treated, please come with a make-up free face  -Avoid wearing earrings and necklaces  -If scalp is being treated, please come with clean hair free from product  -Patient will not be able to get the site wet for 48 hrs  -No submerging wound in standing water (lake, pool, bathtub, hot tub) for 2 weeks  -No physical activity for 48 hrs (further restrictions will be  discussed by MD at time of visit)    If any positives, send to RN for further review  Lidia Tucker RN   Writer called pt to discuss pathology results. Pt scheduled for excision. Excision checklist complete and all questions were negative. Pt denied having any questions or concerns at this time.      Lidia Tucker RN on 11/18/2024 at 1:39 PM      Final Diagnosis  Left shoulder:  - Melanoma in situ, extending to the lateral margins - (see comment and description)    Electronically signed by Sam Cui MD on 11/18/2024 at  8:53 AM        Result Notes     Mallory Vieira PA-C  11/18/2024 12:46 PM CST Back to Top    MIS of the L shoulder - please call patient and let him know results. I did not call patient myself or release results to [facility]. He will need WLE with dermsurg.     thanks       You can access the FollowMyHealth Patient Portal offered by [facility] by registering at the following website: [email or web address]. By joining [employer]’s FollowMyHealth portal, you will also be able to view your health information using other applications (apps) compatible with our system.

## 2024-11-25 ENCOUNTER — OFFICE VISIT (OUTPATIENT)
Dept: DERMATOLOGY | Facility: CLINIC | Age: 54
End: 2024-11-25
Payer: COMMERCIAL

## 2024-11-25 VITALS — DIASTOLIC BLOOD PRESSURE: 84 MMHG | SYSTOLIC BLOOD PRESSURE: 150 MMHG | HEART RATE: 85 BPM | OXYGEN SATURATION: 98 %

## 2024-11-25 DIAGNOSIS — D03.62 MELANOMA IN SITU OF LEFT SHOULDER (H): ICD-10-CM

## 2024-11-25 PROCEDURE — 12032 INTMD RPR S/A/T/EXT 2.6-7.5: CPT | Performed by: DERMATOLOGY

## 2024-11-25 PROCEDURE — 11602 EXC TR-EXT MAL+MARG 1.1-2 CM: CPT | Performed by: DERMATOLOGY

## 2024-11-25 NOTE — NURSING NOTE
John Joshi's goals for this visit include:   Chief Complaint   Patient presents with    Procedure     Excision MIS of the L shoulder       He requests these members of his care team be copied on today's visit information:     PCP: Ange Dubose    Referring Provider:  Mallory Vieira PA-C  [id number] [place] [address]    BP (!) 150/84   Pulse 85   SpO2 98%     Do you need any medication refills at today's visit?         Lynn Henriquez EMT    The following medication was given:     MEDICATION:  Lidocaine with epinephrine 1% [id number]  ROUTE: SQ  SITE: see procedure note  DOSE: 12ml  LOT #: [id number]  : [employer]  EXPIRATION DATE: 6/30/2026  NDC#: 88563-816-65  Was there drug waste? no  Multi-dose vial: Yes    Lynn Henriquez  November 25, 2024     Mastisol, Steri-Strips, Tegaderm and pressure dressing applied to excision site on L shoulder .  Wound care instructions reviewed with patient and AVS provided.  Patient verbalized understanding.  Patient will follow up for suture removal: N/A.  No further questions or concerns at this time.

## 2024-11-25 NOTE — PROGRESS NOTES
DERMATOLOGY EXCISION PROCEDURE NOTE    Dermatology Problem List:  Last FBSE 11/13/24    1. H/O melanoma  - MIS - left shoulder, s/p bx 11/13/24, s/p exc 11/25/24  2. Groin rash  - DDx: irritant dermatitis vs tinea cruris  - Tx: ketoconazole cream, Elidel  2. Flat wart - left palm  - Tx: OTC wart tx    NAME OF PROCEDURE: Excision intermediate layered linear closure  Staff surgeon: Cas Sullivan DO  Scrub Nurse: ESAU Cleveland     PRE-OPERATIVE DIAGNOSIS:  Melanoma in situ  POST-OPERATIVE DIAGNOSIS: same   FINAL EXCISION SIZE(DEFECT SIZE): 1.6 cm x 1.5 cm, with 5 mm margin   FINAL REPAIR LENGTH: 5.1 cm     INDICATIONS: This patient presented with a 0.6cm x 0.5cm melanoma in situ of the left shoulder. Excision was indicated. We discussed the principles of treatment and most likely complications including scarring, bleeding, infection, incomplete excision, wound dehiscence, pain, nerve damage, and recurrence. Informed consent was obtained and the patient underwent the procedure as follows:    PROCEDURE: The patient was taken to the operative suite. Time-out was performed.  The treatment area was anesthetized with 12 ml 1% lidocaine and epinephrine (1:100,000). The area was prepped with Chlorhexidine and rinsed with sterile saline and draped with sterile towels. The lesion was delineated and excised down to subcutaneous fat in a elliptical manner. Hemostasis was obtained by electrocoagulation.     REPAIR: An intermediate linear layered closure was selected as the procedure which would maximally preserve both function and cosmesis.    After the excision of the tumor, the area was undermined. Hemostasis was obtained with bipolar electrocoagulation.  Closure was oriented so that the wound was in the patient's natural skin tension lines. The deeper layers of subcutaneous and superficial (nonmuscle) fascia (deep layer suturing) were then closed with 4-0 monocryl buried vertical mattress sutures. The epidermis was then  carefully approximated along the length of the wound using (superficial layer suturing)  4-0 monocryl running subcuticular sutures.     Estimated blood loss was less than 10 ml for all surgical sites. A sterile pressure dressing was applied and wound care instructions, with a written handout, were given. The [facility] was [facility] alert and ambulatory.    Follow-up in [place]    Clinical Follow-up: 2/26/25 and then 11/19/25 with TOMAS Reyes for skin checks    Staff Physician Comments:   I saw and evaluated the patient with the Physician Assistant (Lisa Le PA-C) and I agree with the assessment and plan and the above description of the procedure. I personally performed the key portions of the procedure and entire exam. I was immediately available in the clinic throughout the procedures.     Cas Sullivan DO    Department of Dermatology  [facility]: Phone: [phone], Fax:[phone]  [facility]: Phone: [phone], Fax: [phone]

## 2024-11-25 NOTE — PATIENT INSTRUCTIONS
Caring for your skin after surgery    After your surgery, a pressure bandage will be placed over the area. This will prevent bleeding. Please follow these instructions over the next 1 to 2 weeks. Following this regimen will help to prevent complications as your wound heals.     For the first 48 hours after your surgery:    Leave the pressure dressing on and keep it dry. If it should come loose, you may re-tape it, but do not take it off.  Relax and take it easy. Do not do any vigorous exercise, heavy lifting or bending forward. This could cause the wound to bleed.  Post-operative pain is usually mild. You may alternate between 1000 mg of Tylenol (acetaminophen) and 400 mg of Ibuprofen every 4 hours.  Do not take more than 4,000 mg of acetaminophen in a 24-hour period or 3200 mg of Ibuprofen in a 24-hr period.  Avoid alcohol and vitamin E as these may increase your tendency to bleed.  You may put an ice pack around the bandaged area for 20 minutes at a time as needed. This may help reduce swelling, bruising, and pain. Make sure the ice pack is waterproof so that the pressure bandage doesn't get wet.  You may see a small amount of drainage or blood on your pressure bandage. This is normal. However:  If drainage or bleeding continues or saturates the bandage, you will need to apply firm pressure over the bandage with a clean washcloth for 15 minutes.  If bleeding continues after applying pressure for 15 minutes, apply an ice pack with gentle pressure to the bandaged area for another 15 minutes.  If bleeding still continues, call our office or go to the nearest emergency room.    48 Hours After Surgery:    Remove outer white bandage down to clear plastic film (Tegaderm).  You may notice dark brown, dried blood under the Tegaderm.  This is normal.    Leave the clear plastic film (Tegaderm) on for up to 2 weeks, as long as it is intact.  If it falls off prior to 2 weeks follow daily wound care below.  If it stays intact  for the full 2 weeks, then remove and treat as normal, healthy skin.    Daily Wound Care (if Tegaderm and Steri-Strips fall off prior to 2 weeks):    Wash wound with a mild soap and water.  Use caution when washing the wound, be gentle and do not let the forceful shower stream hit the wound directly. DO NOT WASH WITH HYDROGEN PEROXIDE AS THIS MIGHT CAUSE THE STITCHES TO DISSOLVE FASTER THAN WHAT WE WANT.    Pat dry.    Apply Vaseline (from a new container or tube) over the suture line with a Q-tip until it is completely healed. It is very important to keep the wound continuously moist, as wounds heal best in a moist environment.    Keep the site covered until it's healed.  You can cover it with a Telfa (non-stick) dressing and tape or a band-aid until healed with normal, healthy skin.      Call Us If:    You have bleeding that will not stop after applying pressure and ice.  You have pain that is not controlled with Tylenol and Ibuprofen.  You have signs or symptoms of an infection such as fever over 100 degrees Fahrenheit, redness, warmth or foul-smelling drainage from the wound    [facility]: [phone]   [facility]: [phone]  For urgent needs outside of business hours call the [facility] at [phone] and ask to speak with the dermatology resident on call

## 2024-11-25 NOTE — LETTER
11/25/2024      John Joshi  [address]  [place] [address]      Dear Colleague,    Thank you for referring your patient, John Joshi, to the [facility]. Please see a copy of my visit note below.    DERMATOLOGY EXCISION PROCEDURE NOTE    Dermatology Problem List:  Last FBSE 11/13/24    1. H/O melanoma  - MIS - left shoulder, s/p bx 11/13/24, s/p exc 11/25/24  2. Groin rash  - DDx: irritant dermatitis vs tinea cruris  - Tx: ketoconazole cream, Elidel  2. Flat wart - left palm  - Tx: OTC wart tx    NAME OF PROCEDURE: Excision intermediate layered linear closure  Staff surgeon: Cas Sullivan DO  Scrub Nurse: ESAU Cleveland     PRE-OPERATIVE DIAGNOSIS:  Melanoma in situ  POST-OPERATIVE DIAGNOSIS: same   FINAL EXCISION SIZE(DEFECT SIZE): 1.6 cm x 1.5 cm, with 5 mm margin   FINAL REPAIR LENGTH: 5.1 cm     INDICATIONS: This patient presented with a 0.6cm x 0.5cm melanoma in situ of the left shoulder. Excision was indicated. We discussed the principles of treatment and most likely complications including scarring, bleeding, infection, incomplete excision, wound dehiscence, pain, nerve damage, and recurrence. Informed consent was obtained and the patient underwent the procedure as follows:    PROCEDURE: The patient was taken to the operative suite. Time-out was performed.  The treatment area was anesthetized with 12 ml 1% lidocaine and epinephrine (1:100,000). The area was prepped with Chlorhexidine and rinsed with sterile saline and draped with sterile towels. The lesion was delineated and excised down to subcutaneous fat in a elliptical manner. Hemostasis was obtained by electrocoagulation.     REPAIR: An intermediate linear layered closure was selected as the procedure which would maximally preserve both function and cosmesis.    After the excision of the tumor, the area was undermined. Hemostasis was obtained with bipolar electrocoagulation.  Closure was oriented so that the  wound was in the patient's natural skin tension lines. The deeper layers of subcutaneous and superficial (nonmuscle) fascia (deep layer suturing) were then closed with 4-0 monocryl buried vertical mattress sutures. The epidermis was then carefully approximated along the length of the wound using (superficial layer suturing)  4-0 monocryl running subcuticular sutures.     Estimated blood loss was less than 10 ml for all surgical sites. A sterile pressure dressing was applied and wound care instructions, with a written handout, were given. The patient was [facility] from the [facility] alert and ambulatory.    Follow-up in [place]    Clinical Follow-up: 2/26/25 and then 11/19/25 with TOMAS Reyes for skin checks    Staff Physician Comments:   I saw and evaluated the patient with the Physician Assistant (Lisa Le PA-C) and I agree with the assessment and plan and the above description of the procedure. I personally performed the key portions of the procedure and entire exam. I was immediately available in the clinic throughout the procedures.     Cas Sullivan DO    Department of Dermatology  [facility]: Phone: [phone], Fax:[phone]  [facility]: Phone: [phone], Fax: [phone]      Again, thank you for allowing me to participate in the care of your patient.        Sincerely,      Cas Sullivan MD

## 2024-11-26 ENCOUNTER — TELEPHONE (OUTPATIENT)
Dept: DERMATOLOGY | Facility: CLINIC | Age: 54
End: 2024-11-26
Payer: COMMERCIAL

## 2024-11-26 NOTE — TELEPHONE ENCOUNTER
John is 1 day s/p excision on left shoulder.    What are you doing to manage your pain?  Patient denies pain.    Are you applying ice? Yes    Have you had any noticeable bleeding through the bandage?  No, dressing is dry and intact.    Do you have any concerns?  Reports mild itching.     Wound care directions reviewed.  Patient declined a post op appointment.  Next skin check has been scheduled.     Jovana Walker RN

## 2024-12-02 LAB
PATH REPORT.COMMENTS IMP SPEC: NORMAL
PATH REPORT.COMMENTS IMP SPEC: NORMAL
PATH REPORT.FINAL DX SPEC: NORMAL
PATH REPORT.GROSS SPEC: NORMAL
PATH REPORT.MICROSCOPIC SPEC OTHER STN: NORMAL
PATH REPORT.RELEVANT HX SPEC: NORMAL

## 2024-12-22 ENCOUNTER — MYC MEDICAL ADVICE (OUTPATIENT)
Dept: DERMATOLOGY | Facility: CLINIC | Age: 54
End: 2024-12-22
Payer: COMMERCIAL

## 2025-01-06 ENCOUNTER — OFFICE VISIT (OUTPATIENT)
Dept: ORTHOPEDICS | Facility: CLINIC | Age: 55
End: 2025-01-06
Attending: FAMILY MEDICINE
Payer: COMMERCIAL

## 2025-01-06 DIAGNOSIS — M19.012 OSTEOARTHRITIS OF LEFT GLENOHUMERAL JOINT: Primary | ICD-10-CM

## 2025-01-06 PROCEDURE — 20611 DRAIN/INJ JOINT/BURSA W/US: CPT | Mod: LT | Performed by: FAMILY MEDICINE

## 2025-01-06 PROCEDURE — 99213 OFFICE O/P EST LOW 20 MIN: CPT | Mod: 25 | Performed by: FAMILY MEDICINE

## 2025-01-06 RX ORDER — TRIAMCINOLONE ACETONIDE 40 MG/ML
40 INJECTION, SUSPENSION INTRA-ARTICULAR; INTRAMUSCULAR
Status: COMPLETED | OUTPATIENT
Start: 2025-01-06 | End: 2025-01-06

## 2025-01-06 RX ADMIN — TRIAMCINOLONE ACETONIDE 40 MG: 40 INJECTION, SUSPENSION INTRA-ARTICULAR; INTRAMUSCULAR at 08:27

## 2025-01-06 ASSESSMENT — PAIN SCALES - GENERAL: PAINLEVEL_OUTOF10: MILD PAIN (2)

## 2025-01-06 NOTE — NURSING NOTE
[facility]   ORTHOPEDICS & SPORTS MEDICINE  [address] [place] [address]  Dept: [phone]  ______________________________________________________________________________    Patient: John Joshi   : 1970   MRN: [id number]   2025    INVASIVE PROCEDURE SAFETY CHECKLIST    Date: 2025   Procedure: Left shoulder injection  Patient Name: John Joshi  MRN: [id number]  YOB: 1970    Action: Complete sections as appropriate. Any discrepancy results in a HARD COPY until resolved.     PRE PROCEDURE:  Patient ID verified with 2 identifiers (name and  or MRN): Yes  Procedure and site verified with patient/designee (when able): Yes  Accurate consent documentation in medical record: Yes  H&P (or appropriate assessment) documented in medical record: Yes  H&P must be up to 20 days prior to procedure and updates within 24 hours of procedure as applicable: NA  Relevant diagnostic and radiology test results appropriately labeled and displayed as applicable: NA  Procedure site(s) marked with provider initials: NA    TIMEOUT:  Time-Out performed immediately prior to starting procedure, including verbal and active participation of all team members addressing the following:Yes  * Correct patient identify  * Confirmed that the correct side and site are marked  * An accurate procedure consent form  * Agreement on the procedure to be done  * Correct patient position  * Relevant images and results are properly labeled and appropriately displayed  * The need to administer antibiotics or fluids for irrigation purposes during the procedure as applicable   * Safety precautions based on patient history or medication use    DURING PROCEDURE: Verification of correct person, site, and procedures any time the responsibility for care of the patient is transferred to another member of the care team.       Prior to injection, verified patient identity using patient's name and date of  birth.  Due to injection administration, patient instructed to remain in clinic for 15 minutes  afterwards, and to report any adverse reaction to me immediately.    Joint injection was performed.      Drug Amount Wasted:  None.  Vial/Syringe: Single dose vial  Expiration Date:  7/31/2026      Yunier Mon, EMT  January 6, 2025

## 2025-01-06 NOTE — PROGRESS NOTES
HISTORY OF PRESENT ILLNESS  Mr. Joshi is a pleasant 54 year old male following up with left shoulder pain.  John is here today for a left glenohumeral joint injection.  He feels quite a bit better with regards to his recent melanoma surgery.  He was able to exercise this morning, which did increase his pain a bit.     PHYSICAL EXAM  General  - normal appearance, in no obvious distress  Musculoskeletal - left shoulder  - inspection: normal bone and joint alignment  - palpation: mildly tender posterior shoulder  Neuro  - no sensory or motor deficit, grossly normal coordination, normal muscle tone        ASSESSMENT & PLAN  Mr. Joshi is a 54 year old male following up with left shoulder pain.    Through shared decision making we did decide to move forward with his injection today.  We also discussed aftercare and treatment moving forward.  I am referring him to physical therapy, specifically for his shoulder.  He should get this started in the coming week.    If John does well we can follow-up as needed for this and other issues.    It was a pleasure seeing John.    Greater than 20 minutes were spent on the day of visit reviewing records, in direct face-to-face consultation and exam, and documentation independent of the procedure.       Dallas Alfred DO, RUSSEL      This note was constructed using Dragon dictation software, please excuse any minor errors in spelling, grammar, or syntax.              Large Joint Injection/Arthocentesis: L glenohumeral joint    Date/Time: 1/6/2025 8:27 AM    Performed by: Dallas Alfred DO  Authorized by: Dallas Alfred DO    Indications:  Pain  Needle Size:  22 G  Guidance: ultrasound    Location:  Shoulder      Site:  L glenohumeral joint  Medications:  40 mg triamcinolone 40 MG/ML  Outcome:  Tolerated well, no immediate complications  Procedure discussed: discussed risks, benefits, and alternatives    Consent Given by:  Patient  Timeout: timeout called immediately prior to  procedure    Prep: patient was prepped and draped in usual sterile fashion         Glenohumeral Injection - Ultrasound Guided    The patient was informed of the risks and the benefits of the procedure and a written consent was signed.  The patient's left shoulder was prepped with chlorhexidine in sterile fashion.   40 mg of kenalog suspension was drawn up into a 5 mL syringe with 3 mL of 1% lidocaine w/o Epi.  Injection was performed using sterile technique.  Under ultrasound guidance a 3.5-inch 22-gauge needle was used to enter the glenohumeral joint.  Posterior approach was used with the patient in lateral recumbent position, arm in neutral position at the side.  Needle placement was visualized and documented with ultrasound.  Ultrasound visualization was necessary due to the small joint space entered.  Injection performed long axis to the probe.  Injection solution visualized within the joint space.  Images were permanently stored for the patient's record.  There were no complications. The patient tolerated the procedure well. There was negligible bleeding.

## 2025-01-06 NOTE — LETTER
1/6/2025      John Joshi  [address]  [place] [address]      Dear Colleague,    Thank you for referring your patient, John Joshi, to the [facility] [place]. Please see a copy of my visit note below.      HISTORY OF PRESENT ILLNESS  Mr. Joshi is a pleasant 54 year old male following up with left shoulder pain.  John is here today for a left glenohumeral joint injection.  He feels quite a bit better with regards to his recent melanoma surgery.  He was able to exercise this morning, which did increase his pain a bit.     PHYSICAL EXAM  General  - normal appearance, in no obvious distress  Musculoskeletal - left shoulder  - inspection: normal bone and joint alignment  - palpation: mildly tender posterior shoulder  Neuro  - no sensory or motor deficit, grossly normal coordination, normal muscle tone        ASSESSMENT & PLAN  Mr. Joshi is a 54 year old male following up with left shoulder pain.    Through shared decision making we did decide to move forward with his injection today.  We also discussed aftercare and treatment moving forward.  I am referring him to physical therapy, specifically for his shoulder.  He should get this started in the coming week.    If John does well we can follow-up as needed for this and other issues.    It was a pleasure seeing John.    Greater than 20 minutes were spent on the day of visit reviewing records, in direct face-to-face consultation and exam, and documentation independent of the procedure.       Dallas Alfred DO, CAQSM      This note was constructed using Dragon dictation software, please excuse any minor errors in spelling, grammar, or syntax.              Large Joint Injection/Arthocentesis: L glenohumeral joint    Date/Time: 1/6/2025 8:27 AM    Performed by: Dallas Alfred DO  Authorized by: Dallas Alfred DO    Indications:  Pain  Needle Size:  22 G  Guidance: ultrasound    Location:  Shoulder      Site:  L  glenohumeral joint  Medications:  40 mg triamcinolone 40 MG/ML  Outcome:  Tolerated well, no immediate complications  Procedure discussed: discussed risks, benefits, and alternatives    Consent Given by:  Patient  Timeout: timeout called immediately prior to procedure    Prep: patient was prepped and draped in usual sterile fashion         Glenohumeral Injection - Ultrasound Guided    The patient was informed of the risks and the benefits of the procedure and a written consent was signed.  The patient's left shoulder was prepped with chlorhexidine in sterile fashion.   40 mg of kenalog suspension was drawn up into a 5 mL syringe with 3 mL of 1% lidocaine w/o Epi.  Injection was performed using sterile technique.  Under ultrasound guidance a 3.5-inch 22-gauge needle was used to enter the glenohumeral joint.  Posterior approach was used with the patient in lateral recumbent position, arm in neutral position at the side.  Needle placement was visualized and documented with ultrasound.  Ultrasound visualization was necessary due to the small joint space entered.  Injection performed long axis to the probe.  Injection solution visualized within the joint space.  Images were permanently stored for the patient's record.  There were no complications. The patient tolerated the procedure well. There was negligible bleeding.                   Again, thank you for allowing me to participate in the care of your patient.        Sincerely,      Dallas Alfred DO    Electronically signed

## 2025-01-06 NOTE — NURSING NOTE
Chief Complaint   Patient presents with    Left Shoulder - Pain, Follow Up     Left shoulder injection       There were no vitals filed for this visit.    There is no height or weight on file to calculate BMI.      LOVE Faye NREMT

## 2025-01-15 DIAGNOSIS — L24.9 IRRITANT DERMATITIS: ICD-10-CM

## 2025-01-15 DIAGNOSIS — B35.6 TINEA CRURIS: ICD-10-CM

## 2025-01-20 RX ORDER — KETOCONAZOLE 20 MG/G
CREAM TOPICAL
Qty: 60 G | Refills: 1 | Status: SHIPPED | OUTPATIENT
Start: 2025-01-20

## 2025-01-20 NOTE — TELEPHONE ENCOUNTER
Last Written Prescription:    ketoconazole (NIZORAL) 2 % external cream  DAILY           Summary: Apply topically daily. Disp-60 g, R-1 E-Prescribe  Dose, Route, Frequency: Topical, DAILYStart: 11/13/2024Ord/Sold: 11/13/2024 (O)Ordered On: 11/13/2024[id number] [place], [place] [facility] [address]: Taking: Long-term: Med Note:              Patient Sig: Apply topically daily.  Ordering Department: [facility]  Authorized By: Mallory Vieira, PADejuanC  Dispense: 60 g  Refills: 1 ordered       ----------------------  Last Visit Date: 11/13/24 Isha [facility]  Future Visit Date: 2/26/25  ----------------------    Refill Decision: Refilled per DERM protocol  Process 1    Lia KENNEY RN  P Central Nursing/Red Flag Triage & Med Refill Team

## 2025-01-24 ASSESSMENT — ACTIVITIES OF DAILY LIVING (ADL)
WASHING_YOUR_BACK: 10
CARRYING_A_HEAVY_OBJECT_OF_10_POUNDS: 1
TOUCHING_THE_BACK_OF_YOUR_NECK: 2
PLEASE_INDICATE_YOR_PRIMARY_REASON_FOR_REFERRAL_TO_THERAPY:: SHOULDER
PUSHING_WITH_THE_INVOLVED_ARM: 6
AT_ITS_WORST?: 8
WHEN_LYING_ON_THE_INVOLVED_SIDE: 10
PUTTING_ON_A_SHIRT_THAT_BUTTONS_DOWN_THE_FRONT: 0
REACHING_FOR_SOMETHING_ON_A_HIGH_SHELF: 6
PUTTING_ON_YOUR_PANTS: 0
PLACING_AN_OBJECT_ON_A_HIGH_SHELF: 5
PUTTING_ON_AN_UNDERSHIRT_OR_A_PULLOVER_SWEATER: 1

## 2025-01-29 ENCOUNTER — THERAPY VISIT (OUTPATIENT)
Dept: PHYSICAL THERAPY | Facility: CLINIC | Age: 55
End: 2025-01-29
Payer: COMMERCIAL

## 2025-01-29 DIAGNOSIS — M25.512 LEFT SHOULDER PAIN: Primary | ICD-10-CM

## 2025-01-29 DIAGNOSIS — M19.012 OSTEOARTHRITIS OF LEFT GLENOHUMERAL JOINT: ICD-10-CM

## 2025-01-29 PROCEDURE — 97110 THERAPEUTIC EXERCISES: CPT | Mod: GP | Performed by: PHYSICAL THERAPIST

## 2025-01-29 PROCEDURE — 97161 PT EVAL LOW COMPLEX 20 MIN: CPT | Mod: GP | Performed by: PHYSICAL THERAPIST

## 2025-01-29 NOTE — PROGRESS NOTES
PHYSICAL THERAPY EVALUATION  Type of Visit: Evaluation              Subjective         Presenting condition or subjective complaint: Shoulder injury  Date of onset:      Relevant medical history: Cancer   Dates & types of surgery:      Prior diagnostic imaging/testing results: MRI; X-ray     Prior therapy history for the same diagnosis, illness or injury: Yes Physical    Prior Level of Function  Transfers: Independent  Ambulation: Independent  ADL: Independent  IADL:     Living Environment  Social support: With a significant other or spouse   Type of home: House; 2-story   Stairs to enter the home: Yes 26 Is there a railing: Yes     Ramp: No   Stairs inside the home: Yes 26 Is there a railing: Yes     Help at home: None  Equipment owned:       Employment: Yes Outcomes analyst  Hobbies/Interests: Outdoors,woodworking,computers,fashion    Patient goals for therapy: Lift heavy    Pain assessment: Location: Left shoulder/Ratin-5/10 PL     Objective   SHOULDER EVALUATION  PAIN: Pain Level at Rest: 0/10  Pain Level with Use: 5/10  INTEGUMENTARY (edema, incisions):   POSTURE:   GAIT:   Weightbearing Status:   Assistive Device(s):   Gait Deviations:   BALANCE/PROPRIOCEPTION:   WEIGHTBEARING ALIGNMENT:   ROM:   (Degrees) Left AROM Left PROM Right AROM  Right PROM   Shoulder Flexion 165      Shoulder Extension       Shoulder Abduction 162      Shoulder Adduction       Shoulder Internal Rotation       Shoulder External Rotation 70      Shoulder Horizontal Abduction       Shoulder Horizontal Adduction       Shoulder Flexion ER       Shoulder Flexion IR       Elbow Extension       Elbow Flexion       Pain:   End feel:     STRENGTH:   Pain: - none + mild ++ moderate +++ severe  Strength Scale: 0-5/5 Left Right   Shoulder Flexion 4 5   Shoulder Extension     Shoulder Abduction 4 5   Shoulder Adduction     Shoulder Internal Rotation 5 5   Shoulder External Rotation 4 5   Shoulder Horizontal Abduction     Shoulder Horizontal  Adduction     Elbow Flexion     Elbow Extension     Mid Trap     Lower Trap     Rhomboid     Serratus Anterior       FLEXIBILITY:   SPECIAL TESTS:   PALPATION:   JOINT MOBILITY:   CERVICAL SCREEN:     Assessment & Plan   CLINICAL IMPRESSIONS  Medical Diagnosis: Left shoulder    Treatment Diagnosis:     Impression/Assessment: Patient is a 54 year old male with left shoulder complaints.  The following significant findings have been identified: Pain, Decreased strength, Impaired muscle performance, and Decreased activity tolerance. These impairments interfere with their ability to perform self care tasks and recreational activities as compared to previous level of function.     Clinical Decision Making (Complexity):  Clinical Presentation: Stable/Uncomplicated  Clinical Presentation Rationale: based on medical and personal factors listed in PT evaluation  Clinical Decision Making (Complexity): Low complexity    PLAN OF CARE  Treatment Interventions:  Interventions: Manual Therapy, Neuromuscular Re-education, Therapeutic Activity, Therapeutic Exercise    Long Term Goals     PT Goal 1  Goal Description: Lifting overhead 10# 0/10 PL  Rationale: to maximize safety and independence with performance of ADLs and functional tasks  Goal Progress: Not lifting overhead  Target Date: 04/23/25      Frequency of Treatment: 2x month  Duration of Treatment: 3 months    Recommended Referrals to Other Professionals:   Education Assessment:        Risks and benefits of evaluation/treatment have been explained.   Patient/Family/caregiver agrees with Plan of Care.     Evaluation Time:     PT Eval, Low Complexity Minutes (30624): 15       Signing Clinician: Kinsey Dunbar PT

## 2025-02-03 ENCOUNTER — THERAPY VISIT (OUTPATIENT)
Dept: PHYSICAL THERAPY | Facility: CLINIC | Age: 55
End: 2025-02-03
Payer: COMMERCIAL

## 2025-02-03 DIAGNOSIS — M25.512 LEFT SHOULDER PAIN: Primary | ICD-10-CM

## 2025-02-03 PROCEDURE — 97110 THERAPEUTIC EXERCISES: CPT | Mod: GP | Performed by: PHYSICAL THERAPIST

## 2025-02-03 NOTE — PROGRESS NOTES
02/03/25 0500   Appointment Info   Signing clinician's name / credentials Kinsey Dunbar DPT   Total/Authorized Visits 6   Visits Used 2   Medical Diagnosis Left shoulder   Progress Note/Certification   Therapy Frequency 2x month   Predicted Duration 3 months   PT Goal 1   Goal Description Lifting overhead 10# 0/10 PL   Rationale to maximize safety and independence with performance of ADLs and functional tasks   Goal Progress 10# pain free   Target Date 04/23/25   Subjective Report   Subjective Report Patient  repots he has been able to prgress with HEP.  Has been able to complete gym program without any issue.  Has been able to bench press with 200# without any issue. At this time feels he is ready to progress independently and will followup as needed in the future.   Objective Measures   Objective Measures Objective Measure 1   Objective Measure 1   Objective Measure Full ROM and pain free   Treatment Interventions (PT)   Interventions Therapeutic Procedure/Exercise   Therapeutic Procedure/Exercise   Therapeutic Procedures: strength, endurance, ROM, flexibility minutes (18667) 15   PTRx Ther Proc 1 Shoulder Press Overhead   PTRx Ther Proc 1 - Details reviewed   PTRx Ther Proc 2 Shoulder Horizontal Abduction Standing   PTRx Ther Proc 2 - Details reviewed   PTRx Ther Proc 3 Shoulder Flexion   PTRx Ther Proc 3 - Details reviewed   PTRx Ther Proc 4 Shoulder External Rotation Sidelying   PTRx Ther Proc 4 - Details reviewed   Total Session Time   Timed Code Treatment Minutes 15   Total Treatment Time (sum of timed and untimed services) 15         DISCHARGE  Reason for Discharge: Patient has met all goals.  Patient chooses to discontinue therapy.    Equipment Issued:     Discharge Plan: Patient to continue home program.    Referring Provider:  Dallas Alfred

## 2025-02-16 ENCOUNTER — OFFICE VISIT (OUTPATIENT)
Dept: URGENT CARE | Facility: URGENT CARE | Age: 55
End: 2025-02-16
Payer: COMMERCIAL

## 2025-02-16 VITALS
DIASTOLIC BLOOD PRESSURE: 91 MMHG | RESPIRATION RATE: 16 BRPM | HEART RATE: 89 BPM | WEIGHT: 255.6 LBS | OXYGEN SATURATION: 97 % | BODY MASS INDEX: 37.31 KG/M2 | TEMPERATURE: 98 F | SYSTOLIC BLOOD PRESSURE: 147 MMHG

## 2025-02-16 DIAGNOSIS — L28.2 PRURITIC RASH: Primary | ICD-10-CM

## 2025-02-16 PROCEDURE — 99213 OFFICE O/P EST LOW 20 MIN: CPT | Performed by: STUDENT IN AN ORGANIZED HEALTH CARE EDUCATION/TRAINING PROGRAM

## 2025-02-16 RX ORDER — TRIAMCINOLONE ACETONIDE 1 MG/G
CREAM TOPICAL 2 TIMES DAILY
Qty: 45 G | Refills: 0 | Status: SHIPPED | OUTPATIENT
Start: 2025-02-16

## 2025-02-16 RX ORDER — PREDNISONE 20 MG/1
40 TABLET ORAL DAILY
Qty: 10 TABLET | Refills: 0 | Status: SHIPPED | OUTPATIENT
Start: 2025-02-16 | End: 2025-02-21

## 2025-02-16 NOTE — PROGRESS NOTES
"ASSESSMENT & PLAN:   Diagnoses and all orders for this visit:  Pruritic rash  -     predniSONE (DELTASONE) 20 MG tablet; Take 2 tablets (40 mg) by mouth daily for 5 days.  -     triamcinolone (KENALOG) 0.1 % external cream; Apply topically 2 times daily.    Pruritic rash x 10 days, possibly due to laundry detergent. Supportive treatment with prednisone burst, OTC antihistamine, triamcinolone cream as needed.    At the end of the encounter, I discussed results, diagnosis, medications. Discussed red flags for immediate return to clinic/ER, as well as indications for follow up if no improvement. Patient and/or caregiver understood and agreed to plan. Patient was stable for discharge.    Patient Instructions   Take prednisone as directed.  Zyrtec 1-2 times per day.  Benadryl as needed.   Triamcinolone cream twice daily as needed for itching.    No follow-ups on file.    ------------------------------------------------------------------------  SUBJECTIVE  History was obtained from patient.    Patient presents with:  Rash: Rash all over his body.     HPI  John Johsi is a(n) 54 year old male presenting to urgent care for pruritic rash x 10 days. Diffuse on body including torso, arms, legs. No new medications, foods, soaps, lotions. Thinks it may be from laundry detergent - his son and grandchild are using their laundry machine and thinks that detergent may be the cause. Using OTC hydrocortisone cream and benadryl which helps temporarily.      Current Outpatient Medications   Medication Sig Dispense Refill    Ferrous Sulfate (IRON PO) Take 1 tablet by mouth daily      ketoconazole (NIZORAL) 2 % external cream APPLY TO AFFECTED AREA TOPICALLY EVERY DAY 60 g 1    needle, disp, (BD HYPODERMIC NEEDLE) 18G X 1\" MISC USE 1 EACH ONCE A WEEK (PHARMACY CAN SUBSTITUTE SIZES OR BRAND OF NEEDLE FOR WHAT IS IN STOCK) 12 each 1    pimecrolimus (ELIDEL) 1 % external cream Apply topically 2 times daily. 60 g 2    predniSONE (DELTASONE) " "20 MG tablet Take 2 tablets (40 mg) by mouth daily for 5 days. 10 tablet 0    syringe/needle, disp, (B-D LUER-ZARA SYRINGE) 25G X 1\" 3 ML MISC INJECT 1 EACH INTO THE MUSCLE ONCE A WEEK (PHARMACY CAN SUB SIZES BRAND OF SYRINGE WITH NEEDLE) 12 each 17    testosterone cypionate (DEPOTESTOSTERONE) 200 MG/ML injection INJECT 0.3 MLS (60 MG) INTO THE MUSCLE ONCE A WEEK 10 mL 5    triamcinolone (KENALOG) 0.1 % external cream Apply topically 2 times daily. 45 g 0     Problem List:  2025-01: Left shoulder pain  2023-09: Biceps muscle tear, right, sequela  2011-11: Routine medical exam  2011-11: CARDIOVASCULAR SCREENING; LDL GOAL LESS THAN 130    Allergies   Allergen Reactions    Percocet [Oxycodone-Acetaminophen] Itching         OBJECTIVE  Vitals:    02/16/25 0932   BP: (!) 147/91   Pulse: 89   Resp: 16   Temp: 98  F (36.7  C)   TempSrc: Oral   SpO2: 97%   Weight: 115.9 kg (255 lb 9.6 oz)     Physical Exam   GENERAL: healthy, alert, no acute distress.   PSYCH: mentation appears normal. Normal affect  HEAD: normocephalic, atraumatic.  EYE: PERRL. EOMs intact. No scleral injection bilaterally.   NOSE: external nose atraumatic without lesions.  OROPHARYNX: moist mucous membranes. No angioedema.   LUNGS: no increased work of breathing.   SKIN: diffuse erythematous maculopapular rash on bilateral forearms, abdomen especially at waistline, and lower back.     No results found for any visits on 02/16/25.  "

## 2025-02-16 NOTE — PATIENT INSTRUCTIONS
Take prednisone as directed.  Zyrtec 1-2 times per day.  Benadryl as needed.   Triamcinolone cream twice daily as needed for itching.

## 2025-02-26 ENCOUNTER — OFFICE VISIT (OUTPATIENT)
Dept: DERMATOLOGY | Facility: CLINIC | Age: 55
End: 2025-02-26
Payer: COMMERCIAL

## 2025-02-26 DIAGNOSIS — L81.4 LENTIGINES: ICD-10-CM

## 2025-02-26 DIAGNOSIS — B07.9 VERRUCA: ICD-10-CM

## 2025-02-26 DIAGNOSIS — L82.1 SK (SEBORRHEIC KERATOSIS): ICD-10-CM

## 2025-02-26 DIAGNOSIS — D18.01 CHERRY ANGIOMA: ICD-10-CM

## 2025-02-26 DIAGNOSIS — D22.9 MULTIPLE BENIGN NEVI: Primary | ICD-10-CM

## 2025-02-26 DIAGNOSIS — Z85.820 HISTORY OF MELANOMA: ICD-10-CM

## 2025-02-26 DIAGNOSIS — D49.2 NEOPLASM OF SKIN: ICD-10-CM

## 2025-02-26 PROCEDURE — 11102 TANGNTL BX SKIN SINGLE LES: CPT | Mod: XS | Performed by: PHYSICIAN ASSISTANT

## 2025-02-26 PROCEDURE — 88342 IMHCHEM/IMCYTCHM 1ST ANTB: CPT | Performed by: DERMATOLOGY

## 2025-02-26 PROCEDURE — 88341 IMHCHEM/IMCYTCHM EA ADD ANTB: CPT | Performed by: DERMATOLOGY

## 2025-02-26 PROCEDURE — 99213 OFFICE O/P EST LOW 20 MIN: CPT | Mod: 25 | Performed by: PHYSICIAN ASSISTANT

## 2025-02-26 PROCEDURE — 88305 TISSUE EXAM BY PATHOLOGIST: CPT | Performed by: DERMATOLOGY

## 2025-02-26 PROCEDURE — 17110 DESTRUCTION B9 LES UP TO 14: CPT | Performed by: PHYSICIAN ASSISTANT

## 2025-02-26 PROCEDURE — 1126F AMNT PAIN NOTED NONE PRSNT: CPT | Performed by: PHYSICIAN ASSISTANT

## 2025-02-26 ASSESSMENT — PAIN SCALES - GENERAL: PAINLEVEL_OUTOF10: NO PAIN (0)

## 2025-02-26 NOTE — LETTER
2/26/2025      John Joshi  [address]  [place] [address]      Dear Colleague,    Thank you for referring your patient, John Joshi, to the [facility]. Please see a copy of my visit note below.    [facility] Dermatology Note  Encounter Date: Feb 26, 2025  Office Visit      Dermatology Problem List:  Last FBSE: 2/26/25    # NUB Upper forehead, S/p Biopsy performed on 2/26/25, pending results.   1. H/O melanoma  - MIS - L shoulder, s/p bx 11/13/24, s/p exc 11/25/24  2. Groin rash  - DDx: irritant dermatitis vs tinea cruris  - Tx: ketoconazole cream, Elidel  2. Flat wart - left palm  - Tx: OTC wart tx  ____________________________________________    Assessment & Plan:  # Neoplasm of unspecified behavior of the skin (D49.2) on the Upper forehead. The differential diagnosis includes DN vs MM vs other.   - Shave biopsy performed today, see procedure note below.   - Photographed today     # Verruca L hand x 2  - Cryotherapy performed today, see procedure note below.    # Hx of Melanoma  - ABCDEs: Counseled ABCDEs of melanoma: Asymmetry, Border (irregularity), Color (not uniform, changes in color), Diameter (greater than 6 mm which is about the size of a pencil eraser), and Evolving (any changes in preexisting moles).  - Sun protection: Counseled SPF30+ sunscreen, UPF clothing, sun avoidance, tanning bed avoidance.   - Recommended yearly dental, ophthalmology,  - Recommended skin exams for all first-degree relatives.  - Recommended follow up is 6 months    # Benign findings: multiple benign nevi, lentigines, cherry angiomas, SKs  - edu on benign etiology  - Signs and Symptoms of non-melanoma skin cancer and ABCDEs of melanoma reviewed with patient. Patient encouraged to perform monthly self skin exams and educated on how to perform them. UV precautions reviewed with patient. Patient was asked about new or changing moles/lesions on body.   - Sunscreen: Apply 20  minutes prior to going outdoors and reapply every two hours, when wet or sweating. We recommend using an SPF 30 or higher, and to use one that is water resistant.     - RTC for changes     Procedures Performed:   CRYOTHERAPY PROCEDURE NOTE: 2 lesions in the above locations were treated with liquid nitrogen utilizing a 5-10sec thaw time. Patient was advised that the treated areas will become red, swollen, may develop a blister and then should crust and peal off in the next 1-2 weeks. Post-procedure instructions were provided.     - Shave biopsy procedure note, location(s): see above. After discussion of benefits and risks including but not limited to bleeding, infection, scar, incomplete removal, recurrence, and non-diagnostic biopsy, written consent and photographs were obtained. The area was cleaned with isopropyl alcohol. 0.5mL of 1% lidocaine with epinephrine was injected to obtain adequate anesthesia of lesion(s). Shave biopsy at site(s) performed. Hemostasis was achieved with aluminium chloride. Petrolatum ointment and a sterile dressing were applied. The patient tolerated the procedure and no complications were noted. The patient was provided with verbal and written post care instructions.      Follow-up: pending path results    Staff and scribe:     Scribe Disclosure:   I, LILY CARVAJAL, am serving as a scribe; to document services personally performed by Mallory Vieira PA-C -based on data collection and the provider's statements to me.     Provider Disclosure:  I agree with above History, Review of Systems, Physical exam and Plan.  I have reviewed the content of the documentation and have edited it as needed. I have personally performed the services documented here and the documentation accurately represents those services and the decisions I have made.      Electronically signed by:    All risks, benefits and alternatives were discussed with patient.  Patient is in agreement and understands the  assessment and plan.  All questions were answered.    Mallory Vieira PA-C, MPAS  [facility] [place]: Phone: [phone], Fax: [phone]  [facility]: Phone: [phone],  Fax: [phone]  [facility]: Phone: [phone], Fax: [phone]  ____________________________________________    CC: No chief complaint on file.      Reviewed patients past medical history and pertinent chart review prior to patient's visit today.     HPI:  Mr. John Joshi is a 54 year old male who presents today as a return patient for FBSE. Last seen on Derm on 11/25/24 where an Excision was performed on his L shoulder due to bx proven MIS.     No fever, chills, night sweats, or excessive weight change.     Patient is otherwise feeling well, without additional concerns.    Labs:  N/A    Physical Exam:  Vitals: There were no vitals taken for this visit.  SKIN: Full skin, which includes the head/face, both arms, chest, back, abdomen,both legs, genitalia and/or groin buttocks, digits and/or nails, was examined.   - Becker's skin type II, has <100 nevi  - There are dome shaped bright red papules on the trunk.   - Multiple regular brown pigmented macules and papules are identified on the trunk and extremities.   - Scattered brown macules on sun exposed areas.  - There are waxy stuck on tan to brown papules on the trunk.   -There are well healed surgical scars without erythema, nodularity or telangiectasias on the prior MM site, NERD  LYMPH: Examination of the pre/post auricular, occipital, cervical, clavicular, axillary and inguinal lymph nodes was negative.    - Upper forehead there is a 5 mm tan macule with globules in the periphery   - There are verrucous papules with thrombosed capillaries and interruption of dermatoglyphics on the: x 1 L palm x 1 L thumb  - No other lesions of concern on areas examined.  "        Medications:  Current Outpatient Medications   Medication Sig Dispense Refill     Ferrous Sulfate (IRON PO) Take 1 tablet by mouth daily       ketoconazole (NIZORAL) 2 % external cream APPLY TO AFFECTED AREA TOPICALLY EVERY DAY 60 g 1     needle, disp, (BD HYPODERMIC NEEDLE) 18G X 1\" MISC USE 1 EACH ONCE A WEEK (PHARMACY CAN SUBSTITUTE SIZES OR BRAND OF NEEDLE FOR WHAT IS IN STOCK) 12 each 1     pimecrolimus (ELIDEL) 1 % external cream Apply topically 2 times daily. 60 g 2     syringe/needle, disp, (B-D LUER-ZARA SYRINGE) 25G X 1\" 3 ML MISC INJECT 1 EACH INTO THE MUSCLE ONCE A WEEK (PHARMACY CAN SUB SIZES BRAND OF SYRINGE WITH NEEDLE) 12 each 17     testosterone cypionate (DEPOTESTOSTERONE) 200 MG/ML injection INJECT 0.3 MLS (60 MG) INTO THE MUSCLE ONCE A WEEK 10 mL 5     triamcinolone (KENALOG) 0.1 % external cream Apply topically 2 times daily. 45 g 0     No current facility-administered medications for this visit.      Past Medical/Surgical History:   Patient Active Problem List   Diagnosis     CARDIOVASCULAR SCREENING; LDL GOAL LESS THAN 130     Routine medical exam     Past Medical History:   Diagnosis Date     Elevated cholesterol      Hypertriglyceridemia                         Again, thank you for allowing me to participate in the care of your patient.        Sincerely,        Mallory Vieira PA-C    Electronically signed  "

## 2025-02-26 NOTE — NURSING NOTE
@John Joshi's goals for this visit include:   Chief Complaint   Patient presents with    Skin Check     Patient is here for a full body skin check. He has a history of Melanoma in Situ. He states that he has concerns about all of his moles due to being diagnosed with melanoma.       He requests these members of his care team be copied on today's visit information: NO    PCP: Ange Dubose    Referring Provider:  Referred Self, MD  No address on file    Do you need any medication refills at today's visit? NO    Chikis Osuna CMA

## 2025-02-26 NOTE — PROGRESS NOTES
[facility] Dermatology Note  Encounter Date: Feb 26, 2025  Office Visit      Dermatology Problem List:  Last FBSE: 2/26/25    # NUB Upper forehead, S/p Biopsy performed on 2/26/25, pending results.   1. H/O melanoma  - MIS - L shoulder, s/p bx 11/13/24, s/p exc 11/25/24  2. Groin rash  - DDx: irritant dermatitis vs tinea cruris  - Tx: ketoconazole cream, Elidel  2. Flat wart - left palm  - Tx: OTC wart tx  ____________________________________________    Assessment & Plan:  # Neoplasm of unspecified behavior of the skin (D49.2) on the Upper forehead. The differential diagnosis includes DN vs MM vs other.   - Shave biopsy performed today, see procedure note below.   - Photographed today     # Verruca L hand x 2  - Cryotherapy performed today, see procedure note below.    # Hx of Melanoma  - ABCDEs: Counseled ABCDEs of melanoma: Asymmetry, Border (irregularity), Color (not uniform, changes in color), Diameter (greater than 6 mm which is about the size of a pencil eraser), and Evolving (any changes in preexisting moles).  - Sun protection: Counseled SPF30+ sunscreen, UPF clothing, sun avoidance, tanning bed avoidance.   - Recommended yearly dental, ophthalmology,  - Recommended skin exams for all first-degree relatives.  - Recommended follow up is 6 months    # Benign findings: multiple benign nevi, lentigines, cherry angiomas, SKs  - edu on benign etiology  - Signs and Symptoms of non-melanoma skin cancer and ABCDEs of melanoma reviewed with patient. Patient encouraged to perform monthly self skin exams and educated on how to perform them. UV precautions reviewed with patient. Patient was asked about new or changing moles/lesions on body.   - Sunscreen: Apply 20 minutes prior to going outdoors and reapply every two hours, when wet or sweating. We recommend using an SPF 30 or higher, and to use one that is water resistant.     - RTC for changes     Procedures Performed:   CRYOTHERAPY PROCEDURE NOTE:  2 lesions in the above locations were treated with liquid nitrogen utilizing a 5-10sec thaw time. Patient was advised that the treated areas will become red, swollen, may develop a blister and then should crust and peal off in the next 1-2 weeks. Post-procedure instructions were provided.     - Shave biopsy procedure note, location(s): see above. After discussion of benefits and risks including but not limited to bleeding, infection, scar, incomplete removal, recurrence, and non-diagnostic biopsy, written consent and photographs were obtained. The area was cleaned with isopropyl alcohol. 0.5mL of 1% lidocaine with epinephrine was injected to obtain adequate anesthesia of lesion(s). Shave biopsy at site(s) performed. Hemostasis was achieved with aluminium chloride. Petrolatum ointment and a sterile dressing were applied. The patient tolerated the procedure and no complications were noted. The patient was provided with verbal and written post care instructions.      Follow-up: pending path results    Staff and scribe:     Scribe Disclosure:   I, LILY CARVAJAL, am serving as a scribe; to document services personally performed by Mallory Vieira PA-C -based on data collection and the provider's statements to me.     Provider Disclosure:  I agree with above History, Review of Systems, Physical exam and Plan.  I have reviewed the content of the documentation and have edited it as needed. I have personally performed the services documented here and the documentation accurately represents those services and the decisions I have made.      Electronically signed by:    All risks, benefits and alternatives were discussed with patient.  Patient is in agreement and understands the assessment and plan.  All questions were answered.    Mallory Vieira PA-C, MPAS  [facility] [place]: Phone: [phone], Fax: [phone]  [facility]: Phone:  [phone],  Fax: [phone]  [facility]: Phone: [phone], Fax: [phone]  ____________________________________________    CC: No chief complaint on file.      Reviewed patients past medical history and pertinent chart review prior to patient's visit today.     HPI:  Mr. John Joshi is a 54 year old male who presents today as a return patient for FBSE. Last seen on Derm on 11/25/24 where an Excision was performed on his L shoulder due to bx proven MIS.     No fever, chills, night sweats, or excessive weight change.     Patient is otherwise feeling well, without additional concerns.    Labs:  N/A    Physical Exam:  Vitals: There were no vitals taken for this visit.  SKIN: Full skin, which includes the head/face, both arms, chest, back, abdomen,both legs, genitalia and/or groin buttocks, digits and/or nails, was examined.   - Becker's skin type II, has <100 nevi  - There are dome shaped bright red papules on the trunk.   - Multiple regular brown pigmented macules and papules are identified on the trunk and extremities.   - Scattered brown macules on sun exposed areas.  - There are waxy stuck on tan to brown papules on the trunk.   -There are well healed surgical scars without erythema, nodularity or telangiectasias on the prior MM site, NERD  LYMPH: Examination of the pre/post auricular, occipital, cervical, clavicular, axillary and inguinal lymph nodes was negative.    - Upper forehead there is a 5 mm tan macule with globules in the periphery   - There are verrucous papules with thrombosed capillaries and interruption of dermatoglyphics on the: x 1 L palm x 1 L thumb  - No other lesions of concern on areas examined.         Medications:  Current Outpatient Medications   Medication Sig Dispense Refill    Ferrous Sulfate (IRON PO) Take 1 tablet by mouth daily      ketoconazole (NIZORAL) 2 % external cream APPLY TO AFFECTED AREA TOPICALLY EVERY DAY 60 g 1    needle, disp, (BD  "HYPODERMIC NEEDLE) 18G X 1\" MISC USE 1 EACH ONCE A WEEK (PHARMACY CAN SUBSTITUTE SIZES OR BRAND OF NEEDLE FOR WHAT IS IN STOCK) 12 each 1    pimecrolimus (ELIDEL) 1 % external cream Apply topically 2 times daily. 60 g 2    syringe/needle, disp, (B-D LUER-ZARA SYRINGE) 25G X 1\" 3 ML MISC INJECT 1 EACH INTO THE MUSCLE ONCE A WEEK (PHARMACY CAN SUB SIZES BRAND OF SYRINGE WITH NEEDLE) 12 each 17    testosterone cypionate (DEPOTESTOSTERONE) 200 MG/ML injection INJECT 0.3 MLS (60 MG) INTO THE MUSCLE ONCE A WEEK 10 mL 5    triamcinolone (KENALOG) 0.1 % external cream Apply topically 2 times daily. 45 g 0     No current facility-administered medications for this visit.      Past Medical/Surgical History:   Patient Active Problem List   Diagnosis    CARDIOVASCULAR SCREENING; LDL GOAL LESS THAN 130    Routine medical exam     Past Medical History:   Diagnosis Date    Elevated cholesterol     Hypertriglyceridemia                       "

## 2025-02-26 NOTE — NURSING NOTE
The following medication was given:     MEDICATION:  Lidocaine with epinephrine 1% 1:[id number]  ROUTE: SQ  SITE: see procedure note  DOSE: 0.5  LOT #: [id number]   : [employer]  EXPIRATION DATE: 08312026  NDC#: 66384-916-31  Was there drug waste? NO  Multi-dose vial: Yes    Chikis Osuna CMA  February 26, 2025

## 2025-02-26 NOTE — PATIENT INSTRUCTIONS
Cryotherapy    What is it?  Use of a very cold liquid, such as liquid nitrogen, to freeze and destroy abnormal skin cells that need to be removed    What should I expect?  Tenderness and redness  A small blister that might grow and fill with dark purple blood. There may be crusting.  More than one treatment may be needed if the lesions do not go away.    How do I care for the treated area?  Gently wash the area with your hands when bathing.  Use a thin layer of Vaseline to help with healing. You may use a Band-Aid.   The area should heal within 7-10 days and may leave behind a pink or lighter color.   Do not use an antibiotic or Neosporin ointment.   You may take acetaminophen (Tylenol) for pain.     Call your Doctor if you have:  Severe pain  Signs of infection (warmth, redness, cloudy yellow drainage, and or a bad smell)  Questions or concerns    Who should I call with questions?      [facility]: [phone]      [facility]: [phone]      For urgent needs outside of business hours call the [facility] at [phone]        and ask for the dermatology resident on call    Patient Education       Proper skin care from [facility]:    -Eliminate harsh soaps as they strip the natural oils from the skin, often resulting in dry itchy skin ( i.e. Dial, Zest, [place] Spring)  -Use mild soaps such as Cetaphil or Dove Sensitive Skin in the shower. You do not need to use soap on arms, legs, and trunk every time you shower unless visibly soiled.   -Avoid hot or cold showers.  -After showering, lightly dry off and apply moisturizing within 2-3 minutes. This will help trap moisture in the skin.   -Aggressive use of a moisturizer at least 1-2 times a day to the entire body (including -Vanicream, Cetaphil, Aquaphor or Cerave) and moisturize hands after every washing.  -We recommend using moisturizers that come in a tub that needs to be scooped out,  not a pump. This has more of an oil base. It will hold moisture in your skin much better than a water base moisturizer. The above recommended are non-pore clogging.      Wear a sunscreen with at least SPF 30 on your face, ears, neck and V of the chest daily. Wear sunscreen on other areas of the body if those areas are exposed to the sun throughout the day. Sunscreens can contain physical and/or chemical blockers. Physical blockers are less likely to clog pores, these include zinc oxide and titanium dioxide. Reapply every two hour and after swimming.     Sunscreen examples: https://www.ewg.org/sunscreen/    UV radiation  UVA radiation remains constant throughout the day and throughout the year. It is a longer wavelength than UVB and therefore penetrates deeper into the skin leading to immediate and delayed tanning, photoaging, and skin cancer. 70-80% of UVA and UVB radiation occurs between the hours of 10am-2pm.  UVB radiation  UVB radiation causes the most harmful effects and is more significant during the summer months. However, snow and ice can reflect UVB radiation leading to skin damage during the winter months as well. UVB radiation is responsible for tanning, burning, inflammation, delayed erythema (pinkness), pigmentation (brown spots), and skin cancer.     I recommend self monthly full body exams and yearly full body exams with a dermatology provider. If you develop a new or changing lesion please follow up for examination. Most skin cancers are pink and scaly or pink and pearly. However, we do see blue/brown/black skin cancers.  Consider the ABCDEs of melanoma when giving yourself your monthly full body exam ( don't forget the groin, buttocks, feet, toes, etc). A-asymmetry, B-borders, C-color, D-diameter, E-elevation or evolving. If you see any of these changes please follow up in clinic. If you cannot see your back I recommend purchasing a hand held mirror to use with a larger wall mirror.       Checking  for Skin Cancer  You can find cancer early by checking your skin each month. There are 3 kinds of skin cancer. They are melanoma, basal cell carcinoma, and squamous cell carcinoma. Doing monthly skin checks is the best way to find new marks or skin changes. Follow the instructions below for checking your skin.   The ABCDEs of checking moles for melanoma   Check your moles or growths for signs of melanoma using ABCDE:   Asymmetry: the sides of the mole or growth don t match  Border: the edges are ragged, notched, or blurred  Color: the color within the mole or growth varies  Diameter: the mole or growth is larger than 6 mm (size of a pencil eraser)  Evolving: the size, shape, or color of the mole or growth is changing (evolving is not shown in the images below)    Checking for other types of skin cancer  Basal cell carcinoma or squamous cell carcinoma have symptoms such as:     A spot or mole that looks different from all other marks on your skin  Changes in how an area feels, such as itching, tenderness, or pain  Changes in the skin's surface, such as oozing, bleeding, or scaliness  A sore that does not heal  New swelling or redness beyond the border of a mole    Who s at risk?  Anyone can get skin cancer. But you are at greater risk if you have:   Fair skin, light-colored hair, or light-colored eyes  Many moles or abnormal moles on your skin  A history of sunburns from sunlight or tanning beds  A family history of skin cancer  A history of exposure to radiation or chemicals  A weakened immune system  If you have had skin cancer in the past, you are at risk for recurring skin cancer.   How to check your skin  Do your monthly skin checkups in front of a full-length mirror. Check all parts of your body, including your:   Head (ears, face, neck, and scalp)  Torso (front, back, and sides)  Arms (tops, undersides, upper, and lower armpits)  Hands (palms, backs, and fingers, including under the nails)  Buttocks and  genitals  Legs (front, back, and sides)  Feet (tops, soles, toes, including under the nails, and between toes)  If you have a lot of moles, take digital photos of them each month. Make sure to take photos both up close and from a distance. These can help you see if any moles change over time.   Most skin changes are not cancer. But if you see any changes in your skin, call your doctor right away. Only he or she can diagnose a problem. If you have skin cancer, seeing your doctor can be the first step toward getting the treatment that could save your life.   [employer] last reviewed this educational content on 4/1/2019 2000-2020 [email or web address]. [address] [place] [address], [place] [address]. All rights reserved. This information is not intended as a substitute for professional medical care. Always follow your healthcare professional's instructions.

## 2025-03-03 ENCOUNTER — TELEPHONE (OUTPATIENT)
Dept: DERMATOLOGY | Facility: CLINIC | Age: 55
End: 2025-03-03
Payer: COMMERCIAL

## 2025-03-03 NOTE — TELEPHONE ENCOUNTER
Excision/Mohs previsit information                                                    Diagnosis: melanoma  Site(s): Upper forehead    Is the surgical site below the waist?  NO  If yes, instruct the patient to purchase over the counter chlorhexidine surgical soap and wash all skin below the belly button twice before surgery    Allergies   Allergen Reactions    Percocet [Oxycodone-Acetaminophen] Itching       Review and update allergy and medication list    Do you take the following medications:  Coumadin, Eliquis, Pradaxa, Xarelto:  NO.  If on Coumadin, INR should be checked within 7 days of surgery.  Range should be 3.5 or less or within therapeutic range.    Do you currently or have you previously had any of the following conditions:  Hepatitis:  NO  HIV/AIDS:  NO  Prolonged bleeding or bleeding disorder:  NO  Pacemaker: NO  Defibrillator:  NO  History of artificial or heart valve replacement:  NO  Endocarditis (inflammation of the inner lining of the heart's chambers and valves):  NO  Have you ever had a prosthetic joint infection:  NO  Pregnant or Breastfeeding:  N/A  Mobility device (wheelchair, transfer difficulty): NO    Important Reminders:                                                      -For Mohs, notify patient they may be here through the lunch hour.  Be prepared to have down time and we recommend they bring a book or something to do.  -Ok to take all of their medications as prescribed  -Notify patient to eat prior to appointment.  The medication is more likely to cause you to feel jittery if you have an empty stomach.  -If face is being treated, please come with a make-up free face  -Avoid wearing earrings and necklaces  -If scalp is being treated, please come with clean hair free from product  -Patient will not be able to get the site wet for 48 hrs  -No submerging wound in standing water (lake, pool, bathtub, hot tub) for 2 weeks  -No physical activity for 48 hrs (further restrictions will be  discussed by MD at time of visit)    If any positives, send to RN for further review  Lidia Tucker RN     Writer called pt to discuss pathology results. Pt scheduled for mohs procedure, this is his second melanoma. Excision checklist complete and all questions were negative. Pt denied having any questions or concerns at this time.    Lidia Tucker RN on 3/3/2025 at 10:51 AM        Final Diagnosis  Upper forehead:  - Malignant melanoma, superficial spreading type, Breslow depth at least 0.4mm, without ulceration, extending to the lateral and deep margins - (see comment and description)    Electronically signed by Sam Cui MD on 3/3/2025 at  8:13 AM      Result Notes     Mallory Vieira PA-C  3/3/2025  8:33 AM CST Back to Top    Melanoma - stage 1 - will need MMS - please refer to derm surg     This poor sulaiman is gonna be a nervous lesa. He already had an MIS on the shoulder. Please call and give results and reassure him. We should have him do another FBSE in 3-4mo if he doesn't already have it scheduled. I held his mychart results so he has not seen them yet.     thanks

## 2025-03-13 ENCOUNTER — OFFICE VISIT (OUTPATIENT)
Dept: DERMATOLOGY | Facility: CLINIC | Age: 55
End: 2025-03-13
Payer: COMMERCIAL

## 2025-03-13 VITALS — OXYGEN SATURATION: 96 % | HEART RATE: 90 BPM | DIASTOLIC BLOOD PRESSURE: 94 MMHG | SYSTOLIC BLOOD PRESSURE: 143 MMHG

## 2025-03-13 DIAGNOSIS — C43.39 MELANOMA OF FOREHEAD (H): Primary | ICD-10-CM

## 2025-03-13 RX ORDER — CETIRIZINE HYDROCHLORIDE 10 MG/1
10 TABLET ORAL DAILY
COMMUNITY

## 2025-03-13 ASSESSMENT — PAIN SCALES - GENERAL: PAINLEVEL_OUTOF10: NO PAIN (0)

## 2025-03-13 NOTE — NURSING NOTE
John Joshi's chief complaint for this visit includes:  Chief Complaint   Patient presents with    Mohs     MM upper forehead     PCP: Ange Dubose    Referring Provider:  Mallory Vieira PA-C  [id number] [place] [address]    BP (!) 143/94   Pulse 90   SpO2 96%   No Pain (0)        Allergies   Allergen Reactions    Percocet [Oxycodone-Acetaminophen] Itching         Do you need any medication refills at today's visit? No    Kristy Moran, CMA

## 2025-03-13 NOTE — PROGRESS NOTES
[facility] Mohs Surgery Procedure Note    Dermatology Problem List:  Last FBSE: 2/26/25     1. H/O melanoma  - MM, upper forehead, resected stage pT1a, BD 0.4mm, s/p MMS 3/13/25  - MIS - L shoulder, s/p bx 11/13/24, s/p exc 11/25/24  2. Groin rash  - DDx: irritant dermatitis vs tinea cruris  - Tx: ketoconazole cream, Elidel  2. Flat wart - left palm  - Tx: OTC wart tx  __________________________________    Date of Service:  Mar 13, 2025  Surgery: Mohs micrographic surgery    Case 1  Repair Type: Intermediate Linear  Repair Size: 5.3 cm  Suture Material:  4-0 Vicryl, 4-0 Monocryl, 5-0 express gut  Tumor Type: Melanoma  Location: Upper forehead  Derm-Path Accession #: [id number]  PreOp Size: 0.7 x 0.6 cm  PostOp Size: 2.9 x 3.1 cm  Mohs Accession #: [id number]  Level of Defect: muscle      Procedure:    Stage I  We discussed the principles of treatment and most likely complications including scarring, bleeding, infection, swelling, pain, crusting, nerve damage, large wound,  incomplete excision, wound dehiscence,  nerve damage, recurrence, and a second procedure may be recommended to obtain the best cosmetic or functional result.    Informed consent was obtained and the patient underwent the procedure as follows:  The patient was placed supine on the operating table. The cancer was identified, outlined with a marker, and verified by the patient. The entire surgical field was prepped with chlorhexidine.  The surgical site was anesthetized using lidocaine with epinephrine.    The area of clinically apparent tumor was excised and sent for permanent sections to rule out invasive melanoma. The peripheral rim of tissue was then surgically excised using a #15 blade and was then transferred onto a specimen sheet maintaining the orientation of the specimen. Hemostasis was obtained using bipolar electrocoagulation. The wound site was then covered with a dressing while the tissue samples were processed for  examination.    The excised tissue was transported to the Mohs histology laboratory maintaining the tissue orientation.  The tissue specimen was relaxed so that the entire surgical margin was in a a single horizontal plane for sectioning and inked for precise mapping.  A precise reference map was drawn to reflect the sectioning of the specimen, colored inking of the margins, and orientation on the patient. The tissue was processed using horizontal sectioning of the base and continuous peripheral margins. Vertical, bread loafed sections were obtained from the central portion of the lesion, prior to being sent for permament sections. The area of clinically apparent scar and residual tumor was excised and sent for permanent sections to rule out invasive melanoma (confirming an accurate Breslow's Depth on a more complete review of the total lesion) per NCCN guidelines. A control biopsy at the right preauricular cheek was taken to compare the density and periodicity of melanocytes along the dermal-epidermal junction.     The tissue sections were stained with Hematoxylin and Eosin (H&E), as well as Melanoma antigen recognized by T cells or Melan-A (MART-1) stain. The histopathologic sections were reviewed in conjunction with the reference map.     Total blocks: 1    Total slides:  4    Within the central portion of the lesion, there was increased density and periodicity of atypical-appearing melanocytes with areas of confluence at the epidermis, consistent with diagnosis above.    Was the skin lesion clear at this stage?: Yes, the skin lesion was determined clear at periphery at this stage: There was a relatively normal periodicity and density of melanocytes along the dermal-epidermal junction noted at the peripheral margins, therefore Mohs surgery was complete.      REPAIR: An intermediate linear layered closure was selected as the procedure which would maximally preserve both function and cosmesis.    After the excision  of the tumor, the area was undermined. Hemostasis was obtained with Bipolar electrocoagulation.  Closure was oriented so that the wound was in the patient's natural skin tension lines. The deeper layers of subcutaneous and superficial (nonmuscle) fascia (deep layer suturing) were then closed with 3-0 vicryl, 4-0 monocryl buried vertical mattress sutures. The epidermis was then carefully approximated along the length of the wound using (superficial layer suturing)  5-0 Express Gut simple running sutures.     Estimated blood loss was less than 10 ml for all surgical sites. A sterile pressure dressing was applied and wound care instructions, with a written handout, were given. The patient was discharged from the [facility] alert and ambulatory.    Follow-up in PRN    Staff Involved:   Scribe/Fellow/Staff     Scribe Disclosure:   I, VIGNESH SOTELO, am serving as a scribe; to document services personally performed by Cas Sullivan MD -based on data collection and the provider's statements to me.    Staff Physician Comments:   I saw and evaluated the patient with the Mohs Surgery Fellow (Dr. Foster Hicks) and I agree with the assessment and plan and the above description of the procedure. I was present for the key portions of the procedure and entire exam. I was immediately available in the clinic throughout the procedures.     Cas Sullivan DO    Department of Dermatology  [facility]: Phone: [phone], Fax:[phone]  [facility]: Phone: [phone], Fax: [phone]    Care and Laboratory Testing Performed at:  [facility]   Dermatology Clinic  [address] [place] [address]

## 2025-03-13 NOTE — NURSING NOTE
The following medication was given:     MEDICATION:  Lidocaine with epinephrine 1% 1:[id number]  ROUTE: SQ  SITE: see procedure note  DOSE: 17 mL  LOT #: [id number]  : [employer]  EXPIRATION DATE: 11/30/2025  NDC#: 4131-9460-80  Was there drug waste? 1 mL  Multi-dose vial: Yes    Vaseline and pressure dressing applied to Mohs site on upper forehead.  Wound care instructions reviewed with patient and AVS provided.  Patient verbalized understanding.  Post op appointment scheduled Yes. Patient will follow up for suture removal: N/A.  No further questions or concerns at this time.      Kristy Moran CMA  March 13, 2025

## 2025-03-13 NOTE — LETTER
3/13/2025      John Joshi  [address]  [place] [address]      Dear Colleague,    Thank you for referring your patient, John Joshi, to the [facility]. Please see a copy of my visit note below.    [facility] Mohs Surgery Procedure Note    Dermatology Problem List:  Last FBSE: 2/26/25     1. H/O melanoma  - MM, upper forehead, resected stage pT1a, BD 0.4mm, s/p MMS 3/13/25  - MIS - L shoulder, s/p bx 11/13/24, s/p exc 11/25/24  2. Groin rash  - DDx: irritant dermatitis vs tinea cruris  - Tx: ketoconazole cream, Elidel  2. Flat wart - left palm  - Tx: OTC wart tx  __________________________________    Date of Service:  Mar 13, 2025  Surgery: Mohs micrographic surgery    Case 1  Repair Type: Intermediate Linear  Repair Size: 5.3 cm  Suture Material:  4-0 Vicryl, 4-0 Monocryl, 5-0 express gut  Tumor Type: Melanoma  Location: Upper forehead  Derm-Path Accession #: [id number]  PreOp Size: 0.7 x 0.6 cm  PostOp Size: 2.9 x 3.1 cm  Mohs Accession #: [id number]  Level of Defect: muscle      Procedure:    Stage I  We discussed the principles of treatment and most likely complications including scarring, bleeding, infection, swelling, pain, crusting, nerve damage, large wound,  incomplete excision, wound dehiscence,  nerve damage, recurrence, and a second procedure may be recommended to obtain the best cosmetic or functional result.    Informed consent was obtained and the patient underwent the procedure as follows:  The patient was placed supine on the operating table. The cancer was identified, outlined with a marker, and verified by the patient. The entire surgical field was prepped with chlorhexidine.  The surgical site was anesthetized using lidocaine with epinephrine.    The area of clinically apparent tumor was excised and sent for permanent sections to rule out invasive melanoma. The peripheral rim of tissue was then surgically excised using a #15 blade and  was then transferred onto a specimen sheet maintaining the orientation of the specimen. Hemostasis was obtained using bipolar electrocoagulation. The wound site was then covered with a dressing while the tissue samples were processed for examination.    The excised tissue was transported to the Mohs histology laboratory maintaining the tissue orientation.  The tissue specimen was relaxed so that the entire surgical margin was in a a single horizontal plane for sectioning and inked for precise mapping.  A precise reference map was drawn to reflect the sectioning of the specimen, colored inking of the margins, and orientation on the patient. The tissue was processed using horizontal sectioning of the base and continuous peripheral margins. Vertical, bread loafed sections were obtained from the central portion of the lesion, prior to being sent for permament sections. The area of clinically apparent scar and residual tumor was excised and sent for permanent sections to rule out invasive melanoma (confirming an accurate Breslow's Depth on a more complete review of the total lesion) per NCCN guidelines. A control biopsy at the right preauricular cheek was taken to compare the density and periodicity of melanocytes along the dermal-epidermal junction.     The tissue sections were stained with Hematoxylin and Eosin (H&E), as well as Melanoma antigen recognized by T cells or Melan-A (MART-1) stain. The histopathologic sections were reviewed in conjunction with the reference map.     Total blocks: 1    Total slides:  4    Within the central portion of the lesion, there was increased density and periodicity of atypical-appearing melanocytes with areas of confluence at the epidermis, consistent with diagnosis above.    Was the skin lesion clear at this stage?: Yes, the skin lesion was determined clear at periphery at this stage: There was a relatively normal periodicity and density of melanocytes along the dermal-epidermal  junction noted at the peripheral margins, therefore Mohs surgery was complete.      REPAIR: An intermediate linear layered closure was selected as the procedure which would maximally preserve both function and cosmesis.    After the excision of the tumor, the area was undermined. Hemostasis was obtained with Bipolar electrocoagulation.  Closure was oriented so that the wound was in the patient's natural skin tension lines. The deeper layers of subcutaneous and superficial (nonmuscle) fascia (deep layer suturing) were then closed with 3-0 vicryl, 4-0 monocryl buried vertical mattress sutures. The epidermis was then carefully approximated along the length of the wound using (superficial layer suturing)  5-0 Express Gut simple running sutures.     Estimated blood loss was less than 10 ml for all surgical sites. A sterile pressure dressing was applied and wound care instructions, with a written handout, were given. The patient was discharged from the [facility] Center alert and ambulatory.    Follow-up in PRN    Staff Involved:   Scribe/Fellow/Staff     Scribe Disclosure:   I, VIGNESH SOTELO, am serving as a scribe; to document services personally performed by Cas Sullivan MD -based on data collection and the provider's statements to me.    Staff Physician Comments:   I saw and evaluated the patient with the Mohs Surgery Fellow (Dr. Foster Hicks) and I agree with the assessment and plan and the above description of the procedure. I was present for the key portions of the procedure and entire exam. I was immediately available in the clinic throughout the procedures.     Cas Sullivan DO    Department of Dermatology  [facility]: Phone: [phone], Fax:[phone]  [facility]: Phone: [phone], Fax: [phone]    Care and Laboratory Testing Performed at:  [facility]  [place]   Dermatology Clinic  [address] [place] [address]      Again, thank you for allowing me to participate in the care of your patient.        Sincerely,        Cas Sullivan MD    Electronically signed

## 2025-03-13 NOTE — PATIENT INSTRUCTIONS
Caring for your skin after surgery    For the first 48 hours after your surgery:  Leave the pressure dressing on and keep it dry. If it should come loose, you may re-tape it, but do not take it off.  Relax and take it easy.  Do not do any vigorous exercise, heavy lifting or bending forward. This could cause the wound to bleed.  If the wound is on your head, sleeping with your head elevated for the first few nights will help with swelling and bleeding. (Use linens/pillow cases that would be ok to get blood on in the event there is some oozing from the bandage).  Post-operative pain is usually mild.  You may alternate between 1000 mg of Tylenol (acetaminophen) and 400 mg of Ibuprofen every 4 hours.  This means, for example, that you could take the followin,000 mg of Tylenol, followed 4 hours later by 400 mg Ibuprofen, followed 4 hours later with 1,000 mg of Tylenol, and so forth.  Do not take more than 4,000 mg of acetaminophen in a 24-hour period or 3200 mg of Ibuprofen in a 24-hr period.    Avoid alcohol and vitamin E as these may increase your tendency to bleed.  Apply an ice pack for 20 min every 2-3 hours while awake.  This may help reduce swelling, bruising, and pain.  Make sure the ice pack is waterproof so that the pressure bandage doesn't get wet.  You may see a small amount of drainage or blood on your pressure bandage. This is normal. However:  If drainage or bleeding continues or saturates the bandage, you will need to apply firm pressure over the bandage with a clean washcloth for 15 minutes.    Remove the saturated bandage.  If bleeding has stopped, apply Vaseline over the suture line and cover with a non-stick bandage.  To add some pressure over the wound, fold a piece of gauze and tape over the area.  If bleeding continues after applying pressure for 15 minutes, apply an ice pack with gentle pressure to the bandaged area for another 15 minutes.  If bleeding still continues, call our office or go to  the nearest emergency room.    48 Hours After Surgery:  Remove the bandage.  If it seems sticky or too difficult to get off, you may need to soak it off in the shower.  Wash wound with a mild soap and water.  Use caution when washing the wound, be gentle and do not let the forceful shower stream hit the wound directly.  Pat dry.  Apply Vaseline or Aquaphor ointment (from a new container or tube) over the suture line with a Q-tip.  Cover the site with a bandage.  Do this daily until the sutures have dissolved.    What to expect:  The first couple of days your wound may be tender and may bleed slightly when doing wound care.  There may be swelling and bruising around the wound, especially if it is near the eyes. For your comfort, you may apply ice or cold compresses to the area.  The area around your wound may be numb for several weeks or even months.  You may experience periodic sharp pain or mild itching around the wound as it heals.    Call Us If:  You have bleeding that will not stop after applying pressure and ice.  You have pain that is not controlled with Tylenol and Ibuprofen.  You have signs or symptoms of an infection such as fever over 100 degrees Fahrenheit, redness, swelling, or warmth spreading from the wound, increasing pain after the first 48 hours, or white/yellow/green drainage from the wound (may or may not have a foul odor).    [facility], Dermatology [phone].  Available M- 8-5.  For urgent needs outside of business hours, call the [facility] at [phone] and ask to speak with/page the dermatology resident on call.

## 2025-03-14 DIAGNOSIS — B35.6 TINEA CRURIS: ICD-10-CM

## 2025-03-14 DIAGNOSIS — L24.9 IRRITANT DERMATITIS: ICD-10-CM

## 2025-03-19 LAB
PATH REPORT.COMMENTS IMP SPEC: NORMAL
PATH REPORT.FINAL DX SPEC: NORMAL
PATH REPORT.GROSS SPEC: NORMAL
PATH REPORT.MICROSCOPIC SPEC OTHER STN: NORMAL
PATH REPORT.RELEVANT HX SPEC: NORMAL

## 2025-03-19 RX ORDER — PIMECROLIMUS 10 MG/G
CREAM TOPICAL 2 TIMES DAILY
Qty: 60 G | Refills: 2 | Status: SHIPPED | OUTPATIENT
Start: 2025-03-19

## 2025-03-19 NOTE — TELEPHONE ENCOUNTER
Last Written Prescription:  pimecrolimus (ELIDEL) 1 % external cream  2 TIMES DAILY           Summary: Apply topically 2 times daily. Disp-60 g, R-2 E-Prescribe  Dose, Route, Frequency: Topical, 2 TIMES DAILYStart: 11/13/2024Ord/Sold: 11/13/2024 (O)Ordered On: 11/13/2024Pharmacy: [id number] - [place], [place] [facility] [address] [facility]: Taking: Long-term: Med Note:              Patient Sig: Apply topically 2 times daily.  Ordering Department: [facility]  Authorized By: Mallory Vieira PA-C  Dispense: 60 g  Refills: 2 ordered       ----------------------  Last Visit Date: 3/13/25 Nate [facility]  Future Visit Date: 6/4/25  ----------------------      [x]  Refill decision: Medication refilled per  Medication Refill in Ambulatory Care  policy.  Derm process 2   []  Supervision: no future appointment scheduled. Scheduling has been notified to contact the pt for appointment.      []  Refill decision: Medication unable to be refilled by RN due to:     []    Pt not seen within past 12 months;  No FOV;  or FOV exceeds timeframe per protocol requirements  []    Compliance - lapse in therapy/gap in refills; No Shows; Cancellations  []    Verification - order discrepancy, clarification needed, Sig modification needed  []    Controlled medication  []    Medication not included in refill protocol policy  []    Abnormal labs/test:  []    Overdue labs/test:    []    Medication not active on Pt's med list  []    Drug interaction Warning  []    Medication - Last script is Reported/Historical/Transitional  []    Advanced refill request  []    Review Needed: New med; Med adjusted within <= 30 days; Safety Alert; Lab monitoring required  []    Other:     Request from pharmacy:  Requested Prescriptions   Pending Prescriptions Disp Refills    pimecrolimus (ELIDEL) 1 % external cream [Pharmacy Med Name: PIMECROLIMUS 1% CREAM] 60 g 2     Sig: APPLY TO AFFECTED AREA TWICE A DAY       Topical Steroids and Nonsteroidals  Protocol Failed - 3/19/2025 10:11 AM        Failed - Medication is active on med list and the sig matches. RN to manually verify dose and sig if red X/fail.     If the protocol passes (green check), you do not need to verify med dose and sig.    A prescription matches if they are the same clinical intention.    For Example: once daily and every morning are the same.    The protocol can not identify upper and lower case letters as matching and will fail.     For Example: Take 1 tablet (50 mg) by mouth daily     TAKE 1 TABLET (50 MG) BY MOUTH DAILY    For all fails (red x), verify dose and sig.    If the refill does match what is on file, the RN can still proceed to approve the refill request.       If they do not match, route to the appropriate provider.             Passed - Patient is age 6 or older        Passed - Authorizing prescriber's most recent note related to this medication read.     If refill request is for ophthalmic use, please forward request to provider for approval.          Passed - High potency steroid not ordered        Passed - Recent (12 mo) or future (90 days) visit within the authorizing provider's specialty     The patient must have completed an in-person or virtual visit within the past 12 months or has a future visit scheduled within the next 90 days with the authorizing provider s specialty.  Urgent care and e-visits do not qualify as an office visit for this protocol.               Lia B, RN  [facility] Central Nursing/Red Flag Triage & Med Refill Team

## 2025-04-01 ENCOUNTER — ALLIED HEALTH/NURSE VISIT (OUTPATIENT)
Dept: DERMATOLOGY | Facility: CLINIC | Age: 55
End: 2025-04-01
Payer: COMMERCIAL

## 2025-04-01 DIAGNOSIS — Z51.89 VISIT FOR WOUND CHECK: Primary | ICD-10-CM

## 2025-04-01 PROCEDURE — 99207 PR NO CHARGE NURSE ONLY: CPT | Performed by: DERMATOLOGY

## 2025-04-01 NOTE — NURSING NOTE
John Joshi comes into clinic today at the request of Dr. Sullivan, ordering provider for a wound check.    Subjective: John Joshi is 19 days s/p Mohs for a melanoma on the forehead with intermediate linear repair.  - feels like the area has been healing very well  - denies pain, swelling, bleeding, purulent drainage  - has been doing daily wound care and keeping it bandaged.     Objective:   Focused examination of the forehead was performed.   - The surgical site is clean and dry. There is no surrounding erythema or purulence. No clinical evidence of infection noted today.     Assessment and Plan:   The patient's surgery site is healing very well. No evidence of infection on examination today.  Wound cleansed with Hibiclens and rinsed with sterile saline.  Photo obtained.  Vaseline and Telfa dressing applied.  The patient was told to continue with Vaseline for another week.  Reviewed the importance of sunscreen and sun protective clothing to reduce the appearance of the scar.  Encouraged patient to reach out if there are any issues with the scar long term.  Patient will follow up for next skin check as scheduled.     This service provided today was under the supervising provider of the day Dr. Steen, who was available if needed.     Jovana Walker RN

## 2025-04-08 ENCOUNTER — LAB (OUTPATIENT)
Dept: LAB | Facility: CLINIC | Age: 55
End: 2025-04-08
Payer: COMMERCIAL

## 2025-04-08 DIAGNOSIS — E29.1 HYPOGONADISM MALE: ICD-10-CM

## 2025-04-08 LAB
ERYTHROCYTE [DISTWIDTH] IN BLOOD BY AUTOMATED COUNT: 12.4 % (ref 10–15)
HCT VFR BLD AUTO: 52 % (ref 40–53)
HGB BLD-MCNC: 17.8 G/DL (ref 13.3–17.7)
MCH RBC QN AUTO: 29.6 PG (ref 26.5–33)
MCHC RBC AUTO-ENTMCNC: 34.2 G/DL (ref 31.5–36.5)
MCV RBC AUTO: 87 FL (ref 78–100)
PLATELET # BLD AUTO: 285 10E3/UL (ref 150–450)
PSA SERPL DL<=0.01 NG/ML-MCNC: 0.99 NG/ML (ref 0–3.5)
RBC # BLD AUTO: 6.01 10E6/UL (ref 4.4–5.9)
WBC # BLD AUTO: 9 10E3/UL (ref 4–11)

## 2025-04-08 PROCEDURE — 85027 COMPLETE CBC AUTOMATED: CPT

## 2025-04-08 PROCEDURE — 84403 ASSAY OF TOTAL TESTOSTERONE: CPT

## 2025-04-08 PROCEDURE — 84153 ASSAY OF PSA TOTAL: CPT

## 2025-04-08 PROCEDURE — 36415 COLL VENOUS BLD VENIPUNCTURE: CPT

## 2025-04-10 LAB — TESTOST SERPL-MCNC: 574 NG/DL (ref 240–950)

## 2025-04-18 DIAGNOSIS — E29.1 HYPOGONADISM MALE: Primary | ICD-10-CM

## 2025-04-20 DIAGNOSIS — L24.9 IRRITANT DERMATITIS: ICD-10-CM

## 2025-04-20 DIAGNOSIS — B35.6 TINEA CRURIS: ICD-10-CM

## 2025-04-21 DIAGNOSIS — E29.1 HYPOGONADISM MALE: ICD-10-CM

## 2025-04-21 RX ORDER — TESTOSTERONE CYPIONATE 200 MG/ML
60 INJECTION, SOLUTION INTRAMUSCULAR WEEKLY
Qty: 10 ML | Refills: 5 | Status: SHIPPED | OUTPATIENT
Start: 2025-04-21

## 2025-04-21 NOTE — TELEPHONE ENCOUNTER
"Last Written Prescription:  testosterone cypionate (DEPOTESTOSTERONE) 200 MG/ML injection 10 mL 5 10/9/2024 -- No   Sig - Route: INJECT 0.3 MLS (60 MG) INTO THE MUSCLE ONCE A WEEK - Intramuscular     ----------------------  Last Visit Date: 4-19-24  Future Visit Date: 5-2-25    Refill decision: Medication unable to be refilled by RN due to:   Controlled medication  Overdue labs  Abn Blood pressure    Request from pharmacy:  Requested Prescriptions   Pending Prescriptions Disp Refills    testosterone cypionate (DEPOTESTOSTERONE) 200 MG/ML injection [Pharmacy Med Name: TESTOSTERONE  MG/ML] 4 mL      Sig: INJECT 0.3 MLS (60 MG) INTO THE MUSCLE ONCE A WEEK       Androgen Agents Failed - 4/21/2025  8:32 AM        Failed - Lipid panel on file in past 12 mos     Recent Labs   Lab Test 07/31/23 0827   CHOL 184   TRIG 257*   HDL 33*      NHDL 151*               Failed - ALT on file within past 12 mos     Recent Labs   Lab Test 07/31/23 0827   ALT 37             Failed - Serum PSA on file within past 12 mos     Lab Results   Component Value Date    PSA 0.99 04/08/2025             Failed - Refills for this classification require provider review        Failed - Most recent blood pressure under 140/90 in past 6 months     BP Readings from Last 3 Encounters:   03/13/25 (!) 143/94   02/16/25 (!) 147/91   11/25/24 (!) 150/84       No data recorded            Failed - Recent (6 mo) or future (90 days) visit within the authorizing provider's specialty     Patient had office visit in the last 6 months or has a visit in the next 30 days with authorizing provider or within the authorizing provider's specialty.  See \"Patient Info\" tab in inbasket, or \"Choose Columns\" in Meds & Orders section of the refill encounter.            Failed - AST on file within past 12 mos     Recent Labs   Lab Test 07/31/23 0827   AST 46*             Passed - Patient is of age 12 and older        Passed - Medication is active on med list and " the sig matches. RN to manually verify dose and sig if red X/fail.     If the protocol passes (green check), you do not need to verify med dose and sig.    A prescription matches if they are the same clinical intention.    For Example: once daily and every morning are the same.    The protocol can not identify upper and lower case letters as matching and will fail.     For Example: Take 1 tablet (50 mg) by mouth daily     TAKE 1 TABLET (50 MG) BY MOUTH DAILY    For all fails (red x), verify dose and sig.    If the refill does match what is on file, the RN can still proceed to approve the refill request.       If they do not match, route to the appropriate provider.             Passed - HCT less than 54% on file within past 12 mos     Recent Labs   Lab Test 04/08/25 0818   HCT 52.0             Passed - Serum Testosterone on file within past 12 mos     Recent Labs   Lab Test 04/08/25 0818   TESTOSTTOTAL 574             Passed - Patient is not pregnant        Passed - No positive pregnancy test on file within past 12 mos

## 2025-04-23 RX ORDER — NEEDLES, DISPOSABLE 25GX5/8"
1 NEEDLE, DISPOSABLE MISCELLANEOUS WEEKLY
Qty: 4 EACH | Refills: 5 | Status: SHIPPED | OUTPATIENT
Start: 2025-04-23

## 2025-04-24 RX ORDER — KETOCONAZOLE 20 MG/G
CREAM TOPICAL
Qty: 60 G | Refills: 1 | Status: SHIPPED | OUTPATIENT
Start: 2025-04-24

## 2025-04-24 NOTE — TELEPHONE ENCOUNTER
Last Written Prescription:  ketoconazole (NIZORAL) 2 % external cream 60 g 1 1/20/2025 -- No   Sig: APPLY TO AFFECTED AREA TOPICALLY EVERY DAY     ----------------------  Last Visit Date: 12-5-24  Future Visit Date: 6-5-25  Derm process 1  ----------------------      Refill decision: Medication refilled per  Derm process    Request from pharmacy:  Requested Prescriptions   Pending Prescriptions Disp Refills    ketoconazole (NIZORAL) 2 % external cream [Pharmacy Med Name: KETOCONAZOLE 2% CREAM] 60 g 1     Sig: APPLY TO AFFECTED AREA TOPICALLY EVERY DAY       Antifungal Agents Passed - 4/24/2025  8:23 AM        Passed - Medication is active on med list and the sig matches. RN to manually verify dose and sig if red X/fail.     If the protocol passes (green check), you do not need to verify med dose and sig.    A prescription matches if they are the same clinical intention.    For Example: once daily and every morning are the same.    The protocol can not identify upper and lower case letters as matching and will fail.     For Example: Take 1 tablet (50 mg) by mouth daily     TAKE 1 TABLET (50 MG) BY MOUTH DAILY    For all fails (red x), verify dose and sig.    If the refill does match what is on file, the RN can still proceed to approve the refill request.       If they do not match, route to the appropriate provider.             Passed - Recent (12 mo) or future (90 days) visit within the authorizing provider's specialty     The patient must have completed an in-person or virtual visit within the past 12 months or has a future visit scheduled within the next 90 days with the authorizing provider s specialty.  Urgent care and e-visits do not qualify as an office visit for this protocol.          Passed - Medication indicated for associated diagnosis     Medication is associated with one or more of the following diagnoses:     Tinea pedis  Tinea cruris  Tinea corporis  Tinea capitis  Tinea barbae  Tinea faciei  Tinea  manuum  Tinea nigra  Onychomycosis  Cutaneous candidias  Pityriasis versicolor

## 2025-06-04 ENCOUNTER — OFFICE VISIT (OUTPATIENT)
Dept: DERMATOLOGY | Facility: CLINIC | Age: 55
End: 2025-06-04
Attending: PHYSICIAN ASSISTANT
Payer: COMMERCIAL

## 2025-06-04 DIAGNOSIS — Z85.820 HISTORY OF MELANOMA: ICD-10-CM

## 2025-06-04 DIAGNOSIS — Z12.83 SCREENING EXAM FOR SKIN CANCER: Primary | ICD-10-CM

## 2025-06-04 DIAGNOSIS — D22.9 MULTIPLE BENIGN NEVI: ICD-10-CM

## 2025-06-04 DIAGNOSIS — D49.2 NEOPLASM OF SKIN: ICD-10-CM

## 2025-06-04 DIAGNOSIS — L81.4 LENTIGINES: ICD-10-CM

## 2025-06-04 DIAGNOSIS — L82.1 SK (SEBORRHEIC KERATOSIS): ICD-10-CM

## 2025-06-04 DIAGNOSIS — D18.01 CHERRY ANGIOMA: ICD-10-CM

## 2025-06-04 PROCEDURE — 88305 TISSUE EXAM BY PATHOLOGIST: CPT | Performed by: PATHOLOGY

## 2025-06-04 ASSESSMENT — PAIN SCALES - GENERAL: PAINLEVEL_OUTOF10: NO PAIN (0)

## 2025-06-04 NOTE — NURSING NOTE
The following medication was given:     MEDICATION:  Lidocaine with epinephrine 1% 1:616022  ROUTE: SQ  SITE: see procedure note  DOSE: 1 mL  LOT #: [id number]  : [employer]urn[employer]  EXPIRATION DATE: 12-31-25  NDC#: 4425-9034-51  Was there drug waste? No  Multi-dose vial: Yes    Jeanette Navarrete  June 4, 2025

## 2025-06-04 NOTE — PROGRESS NOTES
[facility] Dermatology Note  Encounter Date: Jun 4, 2025  Office Visit      Dermatology Problem List:  Last FBSE: 6/4/25    #NUB R forearm, S/p Biopsy performed on 6/4/25, pending results   1. H/O melanoma  - MM, upper forehead, resected stage pT1a, BD 0.4mm, s/p MMS 3/13/25  - MIS - L shoulder, s/p bx 11/13/24, s/p exc 11/25/24  2. Groin rash  - DDx: irritant dermatitis vs tinea cruris  - Tx: ketoconazole cream, Elidel  2. Flat wart - left palm  - Tx: OTC wart tx  ____________________________________________    Assessment & Plan:    # Neoplasm of unspecified behavior of the skin (D49.2) on the R forearm. The differential diagnosis includes DN vs MM vs other. .   - Shave biopsy performed today, see procedure note below.   - Photographed today     # Hx of Melanoma  - ABCDEs: Counseled ABCDEs of melanoma: Asymmetry, Border (irregularity), Color (not uniform, changes in color), Diameter (greater than 6 mm which is about the size of a pencil eraser), and Evolving (any changes in preexisting moles).  - Sun protection: Counseled SPF30+ sunscreen, UPF clothing, sun avoidance, tanning bed avoidance.   - Recommended yearly dental, ophthalmology,  - Recommended skin exams for all first-degree relatives.  - Recommended follow up is 6 months    # Benign findings: multiple benign nevi, lentigines, cherry angiomas, SKs  - edu on benign etiology  - Signs and Symptoms of non-melanoma skin cancer and ABCDEs of melanoma reviewed with patient. Patient encouraged to perform monthly self skin exams and educated on how to perform them. UV precautions reviewed with patient. Patient was asked about new or changing moles/lesions on body.   - Sunscreen: Apply 20 minutes prior to going outdoors and reapply every two hours, when wet or sweating. We recommend using an SPF 30 or higher, and to use one that is water resistant.     - RTC for changes     Procedures Performed:   - Shave biopsy procedure note, location(s): see  above. After discussion of benefits and risks including but not limited to bleeding, infection, scar, incomplete removal, recurrence, and non-diagnostic biopsy, written consent and photographs were obtained. The area was cleaned with isopropyl alcohol. 0.5mL of 1% lidocaine with epinephrine was injected to obtain adequate anesthesia of lesion(s). Shave biopsy at site(s) performed. Hemostasis was achieved with aluminium chloride. Petrolatum ointment and a sterile dressing were applied. The patient tolerated the procedure and no complications were noted. The patient was provided with verbal and written post care instructions.       Follow-up: 6 months for a FBSE    Staff and scribe:     Scribe Disclosure:   I, LILY CARVAJAL, am serving as a scribe; to document services personally performed by Mallory Vieira PA-C -based on data collection and the provider's statements to me.     Provider Disclosure:  I agree with above History, Review of Systems, Physical exam and Plan.  I have reviewed the content of the documentation and have edited it as needed. I have personally performed the services documented here and the documentation accurately represents those services and the decisions I have made.      Electronically signed by:    All risks, benefits and alternatives were discussed with patient.  Patient is in agreement and understands the assessment and plan.  All questions were answered.    Mallory Vieira PA-C, MPAS  [facility]: Phone: [phone], Fax: [phone]  [facility]: Phone: [phone],  Fax: [phone]  [facility]: Phone: [phone], Fax: [phone]  ____________________________________________    CC: Skin Check (Patient here for FBSC. There is one area of concern on the R forearm, R bicep, L forearm, R calf, and a few on the back. Hx MM)      Reviewed patients past medical history and  "pertinent chart review prior to patient's visit today.     HPI:  Mr. John Joshi is a 54 year old male who presents today as a return patient for FBSE. Last seen on by Dr. Sullivan on 3/13/25 where mohs was performed on his upper forehead due to bx proven Melanoma.     Today patient reported a spot of concern on his bicep and forearm. Spot on his forearm is getting darker and growing per patient report.     No fever, chills, night sweats, or excessive weight change.     Patient is otherwise feeling well, without additional concerns.    Labs:  N/A    Physical Exam:  Vitals: There were no vitals taken for this visit.  SKIN: Full skin, which includes the head/face, both arms, chest, back, abdomen,both legs, genitalia and/or groin buttocks, digits and/or nails, was examined.   - Becker's skin type II, has <100 nevi  - There are dome shaped bright red papules on the trunk.   - Multiple regular brown pigmented macules and papules are identified on the trunk and extremities.   - Scattered brown macules on sun exposed areas.  - There are waxy stuck on tan to brown papules on the trunk.   -There are well healed surgical scars without erythema, nodularity or telangiectasias on the prior MM site, NERD  LYMPH: Examination of the pre/post auricular, occipital, cervical, clavicular, axillary and inguinal lymph nodes was negative.    - 3 mm dark brown macule on the R forearm that keeps growing and getting darker per patient   - No other lesions of concern on areas examined.     R forearm        R forearm        Medications:  Current Outpatient Medications   Medication Sig Dispense Refill    cetirizine (ZYRTEC) 10 MG tablet Take 10 mg by mouth daily.      Ferrous Sulfate (IRON PO) Take 1 tablet by mouth daily      ketoconazole (NIZORAL) 2 % external cream APPLY TO AFFECTED AREA TOPICALLY EVERY DAY 60 g 1    needle, disp, (BD HYPODERMIC NEEDLE) 18G X 1\" MISC Inject 1 Needle into the muscle once a week. 4 each 5    pimecrolimus " "(ELIDEL) 1 % external cream APPLY TO AFFECTED AREA TWICE A DAY 60 g 2    syringe/needle, disp, (B-D LUER-ZARA SYRINGE) 25G X 1\" 3 ML MISC INJECT 1 EACH INTO THE MUSCLE ONCE A WEEK (PHARMACY CAN SUB SIZES BRAND OF SYRINGE WITH NEEDLE) 12 each 17    tadalafil (CIALIS) 10 MG tablet Take 1 tablet (10 mg) by mouth daily as needed (for erectile dysfunction). 30 tablet 2    testosterone cypionate (DEPOTESTOSTERONE) 200 MG/ML injection INJECT 0.3 MLS (60 MG) INTO THE MUSCLE ONCE A WEEK 10 mL 5    triamcinolone (KENALOG) 0.1 % external cream Apply topically 2 times daily. 45 g 0     No current facility-administered medications for this visit.      Past Medical/Surgical History:   Patient Active Problem List   Diagnosis    CARDIOVASCULAR SCREENING; LDL GOAL LESS THAN 130    Routine medical exam     Past Medical History:   Diagnosis Date    Elevated cholesterol     Hypertriglyceridemia                       "

## 2025-06-04 NOTE — LETTER
6/4/2025      John Joshi  [address]  [place] [address]      Dear Colleague,    Thank you for referring your patient, John Joshi, to the [facility]. Please see a copy of my visit note below.    [facility] Dermatology Note  Encounter Date: Jun 4, 2025  Office Visit      Dermatology Problem List:  Last FBSE: 6/4/25    #NUB R forearm, S/p Biopsy performed on 6/4/25, pending results   1. H/O melanoma  - MM, upper forehead, resected stage pT1a, BD 0.4mm, s/p MMS 3/13/25  - MIS - L shoulder, s/p bx 11/13/24, s/p exc 11/25/24  2. Groin rash  - DDx: irritant dermatitis vs tinea cruris  - Tx: ketoconazole cream, Elidel  2. Flat wart - left palm  - Tx: OTC wart tx  ____________________________________________    Assessment & Plan:    # Neoplasm of unspecified behavior of the skin (D49.2) on the R forearm. The differential diagnosis includes DN vs MM vs other. .   - Shave biopsy performed today, see procedure note below.   - Photographed today     # Hx of Melanoma  - ABCDEs: Counseled ABCDEs of melanoma: Asymmetry, Border (irregularity), Color (not uniform, changes in color), Diameter (greater than 6 mm which is about the size of a pencil eraser), and Evolving (any changes in preexisting moles).  - Sun protection: Counseled SPF30+ sunscreen, UPF clothing, sun avoidance, tanning bed avoidance.   - Recommended yearly dental, ophthalmology,  - Recommended skin exams for all first-degree relatives.  - Recommended follow up is 6 months    # Benign findings: multiple benign nevi, lentigines, cherry angiomas, SKs  - edu on benign etiology  - Signs and Symptoms of non-melanoma skin cancer and ABCDEs of melanoma reviewed with patient. Patient encouraged to perform monthly self skin exams and educated on how to perform them. UV precautions reviewed with patient. Patient was asked about new or changing moles/lesions on body.   - Sunscreen: Apply 20 minutes prior to going  outdoors and reapply every two hours, when wet or sweating. We recommend using an SPF 30 or higher, and to use one that is water resistant.     - RTC for changes     Procedures Performed:   - Shave biopsy procedure note, location(s): see above. After discussion of benefits and risks including but not limited to bleeding, infection, scar, incomplete removal, recurrence, and non-diagnostic biopsy, written consent and photographs were obtained. The area was cleaned with isopropyl alcohol. 0.5mL of 1% lidocaine with epinephrine was injected to obtain adequate anesthesia of lesion(s). Shave biopsy at site(s) performed. Hemostasis was achieved with aluminium chloride. Petrolatum ointment and a sterile dressing were applied. The patient tolerated the procedure and no complications were noted. The patient was provided with verbal and written post care instructions.       Follow-up: 6 months for a FBSE    Staff and scribe:     Scribe Disclosure:   I, LILY CARVAJAL, am serving as a scribe; to document services personally performed by Mallory Vieira PA-C -based on data collection and the provider's statements to me.     Provider Disclosure:  I agree with above History, Review of Systems, Physical exam and Plan.  I have reviewed the content of the documentation and have edited it as needed. I have personally performed the services documented here and the documentation accurately represents those services and the decisions I have made.      Electronically signed by:    All risks, benefits and alternatives were discussed with patient.  Patient is in agreement and understands the assessment and plan.  All questions were answered.    Mallory Vieira PA-C, [facility]  [facility] [place]: Phone: [phone], Fax: [phone]  [facility]: Phone: [phone],  Fax: [phone]  [facility]: Phone: [phone], Fax:  [phone]  ____________________________________________    CC: Skin Check (Patient here for FBSC. There is one area of concern on the R forearm, R bicep, L forearm, R calf, and a few on the back. Hx MM)      Reviewed patients past medical history and pertinent chart review prior to patient's visit today.     HPI:  Mr. John Joshi is a 54 year old male who presents today as a return patient for FBSE. Last seen on by Dr. Sullivan on 3/13/25 where mohs was performed on his upper forehead due to bx proven Melanoma.     Today patient reported a spot of concern on his bicep and forearm. Spot on his forearm is getting darker and growing per patient report.     No fever, chills, night sweats, or excessive weight change.     Patient is otherwise feeling well, without additional concerns.    Labs:  N/A    Physical Exam:  Vitals: There were no vitals taken for this visit.  SKIN: Full skin, which includes the head/face, both arms, chest, back, abdomen,both legs, genitalia and/or groin buttocks, digits and/or nails, was examined.   - Becker's skin type II, has <100 nevi  - There are dome shaped bright red papules on the trunk.   - Multiple regular brown pigmented macules and papules are identified on the trunk and extremities.   - Scattered brown macules on sun exposed areas.  - There are waxy stuck on tan to brown papules on the trunk.   -There are well healed surgical scars without erythema, nodularity or telangiectasias on the prior MM site, NERD  LYMPH: Examination of the pre/post auricular, occipital, cervical, clavicular, axillary and inguinal lymph nodes was negative.    - 3 mm dark brown macule on the R forearm that keeps growing and getting darker per patient   - No other lesions of concern on areas examined.     R forearm        R forearm        Medications:  Current Outpatient Medications   Medication Sig Dispense Refill     cetirizine (ZYRTEC) 10 MG tablet Take 10 mg by mouth daily.       Ferrous Sulfate (IRON  "PO) Take 1 tablet by mouth daily       ketoconazole (NIZORAL) 2 % external cream APPLY TO AFFECTED AREA TOPICALLY EVERY DAY 60 g 1     needle, disp, (BD HYPODERMIC NEEDLE) 18G X 1\" MISC Inject 1 Needle into the muscle once a week. 4 each 5     pimecrolimus (ELIDEL) 1 % external cream APPLY TO AFFECTED AREA TWICE A DAY 60 g 2     syringe/needle, disp, (B-D LUER-ZARA SYRINGE) 25G X 1\" 3 ML MISC INJECT 1 EACH INTO THE MUSCLE ONCE A WEEK (PHARMACY CAN SUB SIZES BRAND OF SYRINGE WITH NEEDLE) 12 each 17     tadalafil (CIALIS) 10 MG tablet Take 1 tablet (10 mg) by mouth daily as needed (for erectile dysfunction). 30 tablet 2     testosterone cypionate (DEPOTESTOSTERONE) 200 MG/ML injection INJECT 0.3 MLS (60 MG) INTO THE MUSCLE ONCE A WEEK 10 mL 5     triamcinolone (KENALOG) 0.1 % external cream Apply topically 2 times daily. 45 g 0     No current facility-administered medications for this visit.      Past Medical/Surgical History:   Patient Active Problem List   Diagnosis     CARDIOVASCULAR SCREENING; LDL GOAL LESS THAN 130     Routine medical exam     Past Medical History:   Diagnosis Date     Elevated cholesterol      Hypertriglyceridemia                         Again, thank you for allowing me to participate in the care of your patient.        Sincerely,        Mallory Vieira PA-C    Electronically signed"

## 2025-06-04 NOTE — PATIENT INSTRUCTIONS
Wound Care After a Biopsy    What is a skin biopsy?  A skin biopsy allows the doctor to examine a very small piece of tissue under the microscope to determine the diagnosis and the best treatment for the skin condition. A local anesthetic (numbing medicine)  is injected with a very small needle into the skin area to be tested. A small piece of skin is taken from the area. Sometimes a suture (stitch) is used.     What are the risks of a skin biopsy?  I will experience scar, bleeding, swelling, pain, crusting and redness. I may experience incomplete removal or recurrence. Risks of this procedure are excessive bleeding, bruising, infection, nerve damage, numbness, thick (hypertrophic or keloidal) scar and non-diagnostic biopsy.    How should I care for my wound for the first 24 hours?  Keep the wound dry and covered for 24 hours  If it bleeds, hold direct pressure on the area for 15 minutes. If bleeding does not stop then go to the emergency room  Avoid strenuous exercise the first 1-2 days or as your doctor instructs you    How should I care for the wound after 24 hours?  After 24 hours, remove the bandage  You may bathe or shower as normal  If you had a scalp biopsy, you can shampoo as usual and can use shower water to clean the biopsy site daily  Clean the wound twice a day with gentle soap and water  Do not scrub, be gentle  Apply white petroleum/Vaseline after cleaning the wound with a cotton swab or a clean finger, and keep the site covered with a Bandaid /bandage. Bandages are not necessary with a scalp biopsy  If you are unable to cover the site with a Bandaid /bandage, re-apply ointment 2-3 times a day to keep the site moist. Moisture will help with healing  Avoid strenuous activity for first 1-2 days  Avoid lakes, rivers, pools, and oceans until the stitches are removed or the site is healed    How do I clean my wound?  Wash hands thoroughly with soap or use hand  before all wound care  Clean the  wound with gentle soap and water  Apply white petroleum/Vaseline  to wound after it is clean  Replace the Bandaid /bandage to keep the wound covered for the first few days or as instructed by your doctor  If you had a scalp biopsy, warm shower water to the area on a daily basis should suffice    What should I use to clean my wound?   Cotton-tipped applicators (Qtips )  White petroleum jelly (Vaseline ). Use a clean new container and use Q-tips to apply.  Bandaids   as needed  Gentle soap     How should I care for my wound long term?  Do not get your wound dirty  Keep up with wound care for one week or until the area is healed.  A small scab will form and fall off by itself when the area is completely healed. The area will be red and will become pink in color as it heals. Sun protection is very important for how your scar will turn out. Sunscreen with an SPF 30 or greater is recommended once the area is healed.  If you have stitches, stitches need to be removed in 10 days. You may return to our clinic for this or you may have it done locally at your doctor s office.  You should have some soreness but it should be mild and slowly go away over several days. Talk to your doctor about using tylenol for pain,    When should I call my doctor?  If you have increased:   Pain or swelling  Pus or drainage (clear or slightly yellow drainage is ok)  Temperature over 100F  Spreading redness or warmth around wound    When will I hear about my results?  The biopsy results can take 2-3 weeks to come back. The clinic will call you with the results, send you a Mysteriot message, or have you schedule a follow-up clinic or phone time to discuss the results. Contact our clinics if you do not hear from us in 3 weeks.     Who should I call with questions?  [facility]: [phone]   [facility]: [phone]  For urgent needs outside of business hours call the [facility]  at [phone] and ask for the dermatology resident on call      Patient Education       Proper skin care from [facility]:    -Eliminate harsh soaps as they strip the natural oils from the skin, often resulting in dry itchy skin ( i.e. Dial, Zest, Ruth Spring)  -Use mild soaps such as Cetaphil or Dove Sensitive Skin in the shower. You do not need to use soap on arms, legs, and trunk every time you shower unless visibly soiled.   -Avoid hot or cold showers.  -After showering, lightly dry off and apply moisturizing within 2-3 minutes. This will help trap moisture in the skin.   -Aggressive use of a moisturizer at least 1-2 times a day to the entire body (including -Vanicream, Cetaphil, Aquaphor or Cerave) and moisturize hands after every washing.  -We recommend using moisturizers that come in a tub that needs to be scooped out, not a pump. This has more of an oil base. It will hold moisture in your skin much better than a water base moisturizer. The above recommended are non-pore clogging.      Wear a sunscreen with at least SPF 30 on your face, ears, neck and V of the chest daily. Wear sunscreen on other areas of the body if those areas are exposed to the sun throughout the day. Sunscreens can contain physical and/or chemical blockers. Physical blockers are less likely to clog pores, these include zinc oxide and titanium dioxide. Reapply every two hour and after swimming.     Sunscreen examples: https://www.ewg.org/sunscreen/    UV radiation  UVA radiation remains constant throughout the day and throughout the year. It is a longer wavelength than UVB and therefore penetrates deeper into the skin leading to immediate and delayed tanning, photoaging, and skin cancer. 70-80% of UVA and UVB radiation occurs between the hours of 10am-2pm.  UVB radiation  UVB radiation causes the most harmful effects and is more significant during the summer months. However, snow and ice can reflect UVB radiation leading to  skin damage during the winter months as well. UVB radiation is responsible for tanning, burning, inflammation, delayed erythema (pinkness), pigmentation (brown spots), and skin cancer.     I recommend self monthly full body exams and yearly full body exams with a dermatology provider. If you develop a new or changing lesion please follow up for examination. Most skin cancers are pink and scaly or pink and pearly. However, we do see blue/brown/black skin cancers.  Consider the ABCDEs of melanoma when giving yourself your monthly full body exam ( don't forget the groin, buttocks, feet, toes, etc). A-asymmetry, B-borders, C-color, D-diameter, E-elevation or evolving. If you see any of these changes please follow up in clinic. If you cannot see your back I recommend purchasing a hand held mirror to use with a larger wall mirror.       Checking for Skin Cancer  You can find cancer early by checking your skin each month. There are 3 kinds of skin cancer. They are melanoma, basal cell carcinoma, and squamous cell carcinoma. Doing monthly skin checks is the best way to find new marks or skin changes. Follow the instructions below for checking your skin.   The ABCDEs of checking moles for melanoma   Check your moles or growths for signs of melanoma using ABCDE:   Asymmetry: the sides of the mole or growth don t match  Border: the edges are ragged, notched, or blurred  Color: the color within the mole or growth varies  Diameter: the mole or growth is larger than 6 mm (size of a pencil eraser)  Evolving: the size, shape, or color of the mole or growth is changing (evolving is not shown in the images below)    Checking for other types of skin cancer  Basal cell carcinoma or squamous cell carcinoma have symptoms such as:     A spot or mole that looks different from all other marks on your skin  Changes in how an area feels, such as itching, tenderness, or pain  Changes in the skin's surface, such as oozing, bleeding, or  scaliness  A sore that does not heal  New swelling or redness beyond the border of a mole    Who s at risk?  Anyone can get skin cancer. But you are at greater risk if you have:   Fair skin, light-colored hair, or light-colored eyes  Many moles or abnormal moles on your skin  A history of sunburns from sunlight or tanning beds  A family history of skin cancer  A history of exposure to radiation or chemicals  A weakened immune system  If you have had skin cancer in the past, you are at risk for recurring skin cancer.   How to check your skin  Do your monthly skin checkups in front of a full-length mirror. Check all parts of your body, including your:   Head (ears, face, neck, and scalp)  Torso (front, back, and sides)  Arms (tops, undersides, upper, and lower armpits)  Hands (palms, backs, and fingers, including under the nails)  Buttocks and genitals  Legs (front, back, and sides)  Feet (tops, soles, toes, including under the nails, and between toes)  If you have a lot of moles, take digital photos of them each month. Make sure to take photos both up close and from a distance. These can help you see if any moles change over time.   Most skin changes are not cancer. But if you see any changes in your skin, call your doctor right away. Only he or she can diagnose a problem. If you have skin cancer, seeing your doctor can be the first step toward getting the treatment that could save your life.   [employer] last reviewed this educational content on 4/1/2019 2000-2020 [employer]. [address] [place] [address], [place] [address]. All rights reserved. This information is not intended as a substitute for professional medical care. Always follow your healthcare professional's instructions.

## 2025-06-04 NOTE — NURSING NOTE
John Joshi's chief complaint for this visit includes:  Chief Complaint   Patient presents with    Skin Check     Patient here for [facility] [place] [facility]. There is one area of concern on the R forearm, R bicep, L forearm, R calf, and a few on the back. Hx MM     PCP: Ange Dubose    Referring Provider:  Mallory Vieira PA-C  [address] [place] [id number]    There were no vitals taken for this visit.  No Pain (0)        Allergies   Allergen Reactions    Percocet [Oxycodone-Acetaminophen] Itching         Do you need any medication refills at today's visit?    Jeanette Navarrete on 6/4/2025 at 7:24 AM

## 2025-06-05 ENCOUNTER — RESULTS FOLLOW-UP (OUTPATIENT)
Dept: DERMATOLOGY | Facility: CLINIC | Age: 55
End: 2025-06-05

## 2025-06-16 ENCOUNTER — PATIENT OUTREACH (OUTPATIENT)
Dept: CARE COORDINATION | Facility: CLINIC | Age: 55
End: 2025-06-16
Payer: COMMERCIAL

## 2025-06-17 DIAGNOSIS — B35.6 TINEA CRURIS: ICD-10-CM

## 2025-06-17 DIAGNOSIS — L24.9 IRRITANT DERMATITIS: ICD-10-CM

## 2025-06-19 RX ORDER — KETOCONAZOLE 20 MG/G
CREAM TOPICAL
Qty: 60 G | Refills: 11 | Status: SHIPPED | OUTPATIENT
Start: 2025-06-19

## 2025-06-19 NOTE — TELEPHONE ENCOUNTER
Last Written Prescription:  ketoconazole (NIZORAL) 2 % external cream   60 g 1 4/24/2025   ----------------------  Last Visit Date: 6-4-2025 --Follow-up: 6 months for a FBSE   Future Visit Date: 9-8-2025  ----------------------  Process 1,  no warfarin seen on medication list    Refill decision: Medication refilled per  Medication Refill in Ambulatory Care  policy.      Request from pharmacy:  Requested Prescriptions   Pending Prescriptions Disp Refills    ketoconazole (NIZORAL) 2 % external cream [Pharmacy Med Name: KETOCONAZOLE 2% CREAM] 60 g 1     Sig: APPLY TO AFFECTED AREA TOPICALLY EVERY DAY       Antifungal Agents Passed - 6/19/2025 10:56 AM        Passed - Medication is active on med list and the sig matches. RN to manually verify dose and sig if red X/fail.             Passed - Recent (12 month) or future (90 days) visit with authorizing provider's specialty (provided they have been seen in the past 15 months)     The patient must have completed an in-person or virtual visit within the past 12 months or has a future visit scheduled within the next 90 days with the authorizing provider s specialty.  Urgent care and e-visits do not qualify as an office visit for this protocol.          Passed - Medication indicated for associated diagnosis

## 2025-07-13 ENCOUNTER — HEALTH MAINTENANCE LETTER (OUTPATIENT)
Age: 55
End: 2025-07-13

## 2025-07-29 DIAGNOSIS — N52.9 ERECTILE DYSFUNCTION, UNSPECIFIED ERECTILE DYSFUNCTION TYPE: ICD-10-CM

## 2025-07-30 RX ORDER — TADALAFIL 10 MG/1
10 TABLET ORAL DAILY PRN
Qty: 30 TABLET | Refills: 2 | Status: SHIPPED | OUTPATIENT
Start: 2025-07-30

## 2025-07-30 NOTE — TELEPHONE ENCOUNTER
Last Written Prescription:  tadalafil (CIALIS) 10 MG tablet 30 tablet 2 5/2/2025     ----------------------  Last Visit Date: 5/2/25  Future Visit Date: 5/15/26  ----------------------    Refill decision:   [x] Medication refilled per  Medication Refill in Ambulatory Care  policy.      Request from pharmacy:  Requested Prescriptions   Pending Prescriptions Disp Refills    tadalafil (CIALIS) 10 MG tablet [Pharmacy Med Name: TADALAFIL 10 MG TABLET] 30 tablet 2     Sig: TAKE 1 TABLET (10 MG) BY MOUTH DAILY AS NEEDED (FOR ERECTILE DYSFUNCTION).       Erectile Dysfuction Protocol Passed - 7/30/2025  3:20 PM        Passed - Absence of nitrates on medication list        Passed - Absence of Alpha Blockers on Med list        Passed - Medication is active on med list and the sig matches. RN to manually verify dose and sig if red X/fail.     If the protocol passes (green check), you do not need to verify med dose and sig.    A prescription matches if they are the same clinical intention.    For Example: once daily and every morning are the same.    The protocol can not identify upper and lower case letters as matching and will fail.     For Example: Take 1 tablet (50 mg) by mouth daily     TAKE 1 TABLET (50 MG) BY MOUTH DAILY    For all fails (red x), verify dose and sig.    If the refill does match what is on file, the RN can still proceed to approve the refill request.       If they do not match, route to the appropriate provider.             Passed - Recent (12 month) or future (90 days) visit with authorizing provider's specialty (provided they have been seen in the past 15 months)     The patient must have completed an in-person or virtual visit within the past 12 months or has a future visit scheduled within the next 90 days with the authorizing provider s specialty.  Urgent care and e-visits do not qualify as an office visit for this protocol.          Passed - Medication indicated for associated diagnosis      Medication is associated with one or more of the following diagnoses:   Benign prostatic hyperplasia  Erectile dysfunction  Female sexual arousal disorder  Pulmonary hypertension  Raynaud s  Sexual Dysfunction            Passed - Patient is age 18 or older

## 2025-08-07 ENCOUNTER — OFFICE VISIT (OUTPATIENT)
Dept: ORTHOPEDICS | Facility: CLINIC | Age: 55
End: 2025-08-07
Payer: COMMERCIAL

## 2025-08-07 DIAGNOSIS — M19.012 OSTEOARTHRITIS OF LEFT GLENOHUMERAL JOINT: Primary | ICD-10-CM

## 2025-08-07 RX ORDER — TRIAMCINOLONE ACETONIDE 40 MG/ML
40 INJECTION, SUSPENSION INTRA-ARTICULAR; INTRAMUSCULAR
Status: COMPLETED | OUTPATIENT
Start: 2025-08-07 | End: 2025-08-07

## 2025-08-07 RX ORDER — LIDOCAINE HYDROCHLORIDE 10 MG/ML
4 INJECTION, SOLUTION INFILTRATION; PERINEURAL
Status: COMPLETED | OUTPATIENT
Start: 2025-08-07 | End: 2025-08-07

## 2025-08-07 RX ADMIN — TRIAMCINOLONE ACETONIDE 40 MG: 40 INJECTION, SUSPENSION INTRA-ARTICULAR; INTRAMUSCULAR at 10:38

## 2025-08-07 RX ADMIN — LIDOCAINE HYDROCHLORIDE 4 ML: 10 INJECTION, SOLUTION INFILTRATION; PERINEURAL at 10:38

## 2025-08-07 ASSESSMENT — PAIN SCALES - GENERAL: PAINLEVEL_OUTOF10: MODERATE PAIN (4)

## 2025-09-03 ENCOUNTER — PATIENT OUTREACH (OUTPATIENT)
Dept: CARE COORDINATION | Facility: CLINIC | Age: 55
End: 2025-09-03
Payer: COMMERCIAL

## 2025-09-04 DIAGNOSIS — Z12.11 COLON CANCER SCREENING: ICD-10-CM
